# Patient Record
Sex: FEMALE | Race: OTHER | HISPANIC OR LATINO | Employment: UNEMPLOYED | URBAN - METROPOLITAN AREA
[De-identification: names, ages, dates, MRNs, and addresses within clinical notes are randomized per-mention and may not be internally consistent; named-entity substitution may affect disease eponyms.]

---

## 2017-01-10 ENCOUNTER — ALLSCRIPTS OFFICE VISIT (OUTPATIENT)
Dept: OTHER | Facility: OTHER | Age: 22
End: 2017-01-10

## 2017-05-01 ENCOUNTER — APPOINTMENT (OUTPATIENT)
Dept: LAB | Facility: CLINIC | Age: 22
End: 2017-05-01
Payer: COMMERCIAL

## 2017-05-01 ENCOUNTER — ALLSCRIPTS OFFICE VISIT (OUTPATIENT)
Dept: OTHER | Facility: OTHER | Age: 22
End: 2017-05-01

## 2017-05-01 DIAGNOSIS — Z11.1 ENCOUNTER FOR SCREENING FOR RESPIRATORY TUBERCULOSIS: ICD-10-CM

## 2017-05-01 DIAGNOSIS — Z00.00 ENCOUNTER FOR GENERAL ADULT MEDICAL EXAMINATION WITHOUT ABNORMAL FINDINGS: ICD-10-CM

## 2017-05-01 PROCEDURE — 86706 HEP B SURFACE ANTIBODY: CPT

## 2017-05-01 PROCEDURE — 36415 COLL VENOUS BLD VENIPUNCTURE: CPT

## 2017-05-01 PROCEDURE — 86480 TB TEST CELL IMMUN MEASURE: CPT

## 2017-05-01 PROCEDURE — 87340 HEPATITIS B SURFACE AG IA: CPT

## 2017-05-02 LAB
HBV SURFACE AB SER-ACNC: <3.1 MIU/ML
HBV SURFACE AG SER QL: NORMAL

## 2017-05-03 LAB
ANNOTATION COMMENT IMP: NORMAL
GAMMA INTERFERON BACKGROUND BLD IA-ACNC: 0.04 IU/ML
M TB IFN-G BLD-IMP: NEGATIVE
M TB IFN-G CD4+ BCKGRND COR BLD-ACNC: <0.01 IU/ML
M TB IFN-G CD4+ T-CELLS BLD-ACNC: 0.03 IU/ML
MITOGEN IGNF BLD-ACNC: 9.65 IU/ML
QUANTIFERON-TB GOLD IN TUBE: NORMAL
SERVICE CMNT-IMP: NORMAL

## 2017-05-04 ENCOUNTER — GENERIC CONVERSION - ENCOUNTER (OUTPATIENT)
Dept: OTHER | Facility: OTHER | Age: 22
End: 2017-05-04

## 2017-05-08 ENCOUNTER — ALLSCRIPTS OFFICE VISIT (OUTPATIENT)
Dept: OTHER | Facility: OTHER | Age: 22
End: 2017-05-08

## 2017-06-13 ENCOUNTER — APPOINTMENT (OUTPATIENT)
Dept: LAB | Facility: CLINIC | Age: 22
End: 2017-06-13
Payer: COMMERCIAL

## 2017-06-13 ENCOUNTER — GENERIC CONVERSION - ENCOUNTER (OUTPATIENT)
Dept: OTHER | Facility: OTHER | Age: 22
End: 2017-06-13

## 2017-06-13 ENCOUNTER — ALLSCRIPTS OFFICE VISIT (OUTPATIENT)
Dept: OTHER | Facility: OTHER | Age: 22
End: 2017-06-13

## 2017-06-13 DIAGNOSIS — Z00.00 ENCOUNTER FOR GENERAL ADULT MEDICAL EXAMINATION WITHOUT ABNORMAL FINDINGS: ICD-10-CM

## 2017-06-13 DIAGNOSIS — I95.1 ORTHOSTATIC HYPOTENSION: ICD-10-CM

## 2017-06-13 LAB
ALBUMIN SERPL BCP-MCNC: 3.4 G/DL (ref 3.5–5)
ALP SERPL-CCNC: 60 U/L (ref 46–116)
ALT SERPL W P-5'-P-CCNC: 22 U/L (ref 12–78)
ANION GAP SERPL CALCULATED.3IONS-SCNC: 7 MMOL/L (ref 4–13)
AST SERPL W P-5'-P-CCNC: 12 U/L (ref 5–45)
BASOPHILS # BLD AUTO: 0.05 THOUSANDS/ΜL (ref 0–0.1)
BASOPHILS NFR BLD AUTO: 1 % (ref 0–1)
BILIRUB SERPL-MCNC: 0.56 MG/DL (ref 0.2–1)
BUN SERPL-MCNC: 12 MG/DL (ref 5–25)
CALCIUM SERPL-MCNC: 8.8 MG/DL (ref 8.3–10.1)
CHLORIDE SERPL-SCNC: 103 MMOL/L (ref 100–108)
CO2 SERPL-SCNC: 30 MMOL/L (ref 21–32)
CREAT SERPL-MCNC: 0.48 MG/DL (ref 0.6–1.3)
EOSINOPHIL # BLD AUTO: 0.37 THOUSAND/ΜL (ref 0–0.61)
EOSINOPHIL NFR BLD AUTO: 4 % (ref 0–6)
ERYTHROCYTE [DISTWIDTH] IN BLOOD BY AUTOMATED COUNT: 12.3 % (ref 11.6–15.1)
GFR SERPL CREATININE-BSD FRML MDRD: >60 ML/MIN/1.73SQ M
GLUCOSE P FAST SERPL-MCNC: 84 MG/DL (ref 65–99)
HBV SURFACE AB SER-ACNC: 826.09 MIU/ML
HCT VFR BLD AUTO: 39.7 % (ref 34.8–46.1)
HGB BLD-MCNC: 13 G/DL (ref 11.5–15.4)
LYMPHOCYTES # BLD AUTO: 3 THOUSANDS/ΜL (ref 0.6–4.47)
LYMPHOCYTES NFR BLD AUTO: 35 % (ref 14–44)
MCH RBC QN AUTO: 29.7 PG (ref 26.8–34.3)
MCHC RBC AUTO-ENTMCNC: 32.7 G/DL (ref 31.4–37.4)
MCV RBC AUTO: 91 FL (ref 82–98)
MONOCYTES # BLD AUTO: 0.9 THOUSAND/ΜL (ref 0.17–1.22)
MONOCYTES NFR BLD AUTO: 11 % (ref 4–12)
NEUTROPHILS # BLD AUTO: 4.26 THOUSANDS/ΜL (ref 1.85–7.62)
NEUTS SEG NFR BLD AUTO: 49 % (ref 43–75)
NRBC BLD AUTO-RTO: 0 /100 WBCS
PLATELET # BLD AUTO: 309 THOUSANDS/UL (ref 149–390)
PMV BLD AUTO: 9.7 FL (ref 8.9–12.7)
POTASSIUM SERPL-SCNC: 3.7 MMOL/L (ref 3.5–5.3)
PROT SERPL-MCNC: 6.8 G/DL (ref 6.4–8.2)
RBC # BLD AUTO: 4.37 MILLION/UL (ref 3.81–5.12)
SODIUM SERPL-SCNC: 140 MMOL/L (ref 136–145)
TSH SERPL DL<=0.05 MIU/L-ACNC: 0.62 UIU/ML (ref 0.36–3.74)
WBC # BLD AUTO: 8.61 THOUSAND/UL (ref 4.31–10.16)

## 2017-06-13 PROCEDURE — 84443 ASSAY THYROID STIM HORMONE: CPT

## 2017-06-13 PROCEDURE — 85025 COMPLETE CBC W/AUTO DIFF WBC: CPT

## 2017-06-13 PROCEDURE — 36415 COLL VENOUS BLD VENIPUNCTURE: CPT

## 2017-06-13 PROCEDURE — 80053 COMPREHEN METABOLIC PANEL: CPT

## 2017-06-13 PROCEDURE — 86706 HEP B SURFACE ANTIBODY: CPT

## 2017-06-14 DIAGNOSIS — I95.1 ORTHOSTATIC HYPOTENSION: ICD-10-CM

## 2017-06-14 DIAGNOSIS — Z00.00 ENCOUNTER FOR GENERAL ADULT MEDICAL EXAMINATION WITHOUT ABNORMAL FINDINGS: ICD-10-CM

## 2017-07-17 ENCOUNTER — APPOINTMENT (OUTPATIENT)
Dept: LAB | Facility: CLINIC | Age: 22
End: 2017-07-17
Payer: COMMERCIAL

## 2017-07-17 DIAGNOSIS — Z00.00 ENCOUNTER FOR GENERAL ADULT MEDICAL EXAMINATION WITHOUT ABNORMAL FINDINGS: ICD-10-CM

## 2017-07-17 PROCEDURE — 36415 COLL VENOUS BLD VENIPUNCTURE: CPT

## 2017-07-17 PROCEDURE — 86480 TB TEST CELL IMMUN MEASURE: CPT

## 2017-07-20 ENCOUNTER — GENERIC CONVERSION - ENCOUNTER (OUTPATIENT)
Dept: OTHER | Facility: OTHER | Age: 22
End: 2017-07-20

## 2017-07-20 LAB
ANNOTATION COMMENT IMP: NORMAL
GAMMA INTERFERON BACKGROUND BLD IA-ACNC: 0.05 IU/ML
M TB IFN-G BLD-IMP: NEGATIVE
M TB IFN-G CD4+ BCKGRND COR BLD-ACNC: 0.02 IU/ML
M TB IFN-G CD4+ T-CELLS BLD-ACNC: 0.07 IU/ML
MITOGEN IGNF BLD-ACNC: 9.4 IU/ML
QUANTIFERON-TB GOLD IN TUBE: NORMAL
SERVICE CMNT-IMP: NORMAL

## 2017-10-02 ENCOUNTER — ALLSCRIPTS OFFICE VISIT (OUTPATIENT)
Dept: OTHER | Facility: OTHER | Age: 22
End: 2017-10-02

## 2017-10-02 DIAGNOSIS — E16.1 OTHER HYPOGLYCEMIA (CODE): ICD-10-CM

## 2017-10-02 DIAGNOSIS — K59.09 OTHER CONSTIPATION: ICD-10-CM

## 2017-10-02 DIAGNOSIS — R53.82 CHRONIC FATIGUE: ICD-10-CM

## 2017-10-02 DIAGNOSIS — R42 DIZZINESS AND GIDDINESS: ICD-10-CM

## 2017-10-02 DIAGNOSIS — R82.90 ABNORMAL FINDING IN URINE: ICD-10-CM

## 2017-10-03 NOTE — PROGRESS NOTES
Assessment  1  Dizziness (780 4) (R42)   2  Need for influenza vaccination (V04 81) (Z23)   3  Chronic fatigue (780 79) (R53 82)   4  Cold feeling (780 99) (R68 89)   5  Hair thinning (704 00) (L65 9)   6  Fasting hypoglycemia (251 1) (E16 1)   7  Chronic constipation (564 00) (K59 09)    Plan  Chronic constipation    · * US ABDOMEN COMPLETE; Status:Hold For - Scheduling; Requested for:02Oct2017;   Chronic fatigue    · (1) TSH WITH FT4 REFLEX; Status:Active; Requested for:02Oct2017;   Dizziness    · (1) C-PEPTIDE; Status:Active; Requested BNN:77ZHW0225;    · (1) INSULIN; Status:Active; Requested ARIELLA:63QZR1932;    · EKG/ECG- POC; Status:Active - Perform Order; Requested YDO:[id number];   Dizziness, Fasting hypoglycemia    · (1) HEMOGLOBIN A1C; Status:Active; Requested UBS:32QXH8378;   Fasting hypoglycemia    · (1) CBC/PLT/DIFF; Status:Active; Requested LEO:67CMA4790;    · (1) COMPREHENSIVE METABOLIC PANEL; Status:Active; Requested VDE:19ITP4374;    · (1) CORTISOL AM SPECIMEN; Status:Active; Requested UUN:59LXU6316;    · (1) OSMOLALITY, URINE; Status:Active; Requested CARL:[id number];   Need for influenza vaccination    · Fluzone Quadrivalent 0 5 ML Intramuscular Suspension Prefilled Syringe;  INJECT 0 5  ML Intramuscular; To Be Done: 77KHE2896    Discussion/Summary    Her symptoms are chronic fatigue chronic constipation episodes of dizzy and temporary loss of vision when she gets dizzy, I will order more labs EKGs done which is normal,order ultrasound of abdomen, to see if any mass in the adrenals and she has chronic constipation,vaccine is given and she will follow up 2-3 wks  The patient was counseled regarding instructions for management,-prognosis,-impressions,-risks and benefits of treatment options,-importance of compliance with treatment  Possible side effects of new medications were reviewed with the patient/guardian today  The treatment plan was reviewed with the patient/guardian   The patient/guardian understands and agrees with the treatment plan      Chief Complaint  Patient is here today for follow up of chronic conditions described in HPI  History of Present Illness  She says that few days ago on the weekend she was sitting in her bed and she started feeling something in her stomach feeling hungry, and then she started feeling dizzy , and she thought she could not see for few seconds and then she laid down and her heart was racing,did not lose any consciousness after a few minutes she went to the kitchen since then she has been feeling fine, she also says she is always cold, and her here has been losing for long time in last few years,has constipation most of the time for the last 5 year and history of more hair loss for 5 years  feels bloated all the time and she takes Metamucil which helpsfather is diabetic,says she also has lost some weight, but now she is gradually gaining her appetite is normal,denies any fever or chills,is on birth control pillsalso wants the flu vaccinehad eye exam which was normal      Review of Systems    Constitutional: No fever, no chills, feels well, no tiredness, no recent weight gain or weight loss  Eyes: No complaints of eye pain, no red eyes, no eyesight problems, no discharge, no dry eyes, no itching of eyes  ENT: no complaints of earache, no loss of hearing, no nose bleeds, no nasal discharge, no sore throat, no hoarseness  Cardiovascular: No complaints of slow heart rate, no fast heart rate, no chest pain, no palpitations, no leg claudication, no lower extremity edema  Respiratory: No complaints of shortness of breath, no wheezing, no cough, no SOB on exertion, no orthopnea, no PND  Gastrointestinal: No complaints of abdominal pain, no constipation, no nausea or vomiting, no diarrhea, no bloody stools  Genitourinary: No complaints of dysuria, no incontinence, no pelvic pain, no dysmenorrhea, no vaginal discharge or bleeding     Musculoskeletal: No complaints of arthralgias, no myalgias, no joint swelling or stiffness, no limb pain or swelling  Integumentary: No complaints of skin rash or lesions, no itching, no skin wounds, no breast pain or lump  Neurological: No complaints of headache, no confusion, no convulsions, no numbness, no dizziness or fainting, no tingling, no limb weakness, no difficulty walking  Psychiatric: Not suicidal, no sleep disturbance, no anxiety or depression, no change in personality, no emotional problems  Endocrine: No complaints of proptosis, no hot flashes, no muscle weakness, no deepening of the voice, no feelings of weakness  Hematologic/Lymphatic: No complaints of swollen glands, no swollen glands in the neck, does not bleed easily, does not bruise easily  ROS reviewed  Active Problems  1  Annual physical exam (V70 0) (Z00 00)   2  Back strain (847 9) (S39 012A)   3  Bacterial vaginosis (616 10,041 9) (N76 0,B96 89)   4  Birth control counseling (V25 09) (Z30 09)   5  Blurry vision (368 8) (H53 8)   6  Chlamydial infection of lower genitourinary tract (099 53) (A56 00)   7  Chronic fatigue (780 79) (R53 82)   8  Constipation (564 00) (K59 00)   9  Dysuria (788 1) (R30 0)   10  Encounter for routine gynecological examination (V72 31) (Z01 419)   11  Hair thinning (704 00) (L65 9)   12  Neck muscle spasm (728 85) (M62 838)   13  Need for hepatitis B vaccination (V05 3) (Z23)   14  Need for Tdap vaccination (V06 1) (Z23)   15  Postural hypotension (458 0) (I95 1)   16  Scoliosis (737 30) (M41 9)   17  Screening for STD (sexually transmitted disease) (V74 5) (Z11 3)   18  Tuberculosis screening (V74 1) (Z11 1)   19  Vaginal candidiasis (112 1) (B37 3)    Past Medical History    The active problems and past medical history were reviewed and updated today  Surgical History    The surgical history was reviewed and updated today  Family History  Father    1   Family history of diabetes mellitus (V18 0) (Z83 3)    The family history was reviewed and updated today  Social History   · Never a smoker   · No drug use  The social history was reviewed and updated today  Current Meds   1  Junel FE 1/20 1-20 MG-MCG Oral Tablet; TAKE 1 TABLET DAILY AS DIRECTED; Therapy: 40XZY8118 to (Evaluate:85Guc0527) Recorded    The medication list was reviewed and updated today  Allergies  1  No Known Drug Allergies    Vitals  Vital Signs    Recorded: 82BGF5825 02:39PM   Heart Rate 68   Respiration 16   Systolic 528   Diastolic 70   Height 5 ft 5 in   Weight 119 lb    BMI Calculated 19 8   BSA Calculated 1 59     Physical Exam    Constitutional   General appearance: No acute distress, well appearing and well nourished  Eyes   Conjunctiva and lids: No swelling, erythema or discharge  Pupils and irises: Equal, round and reactive to light  Ears, Nose, Mouth, and Throat   External inspection of ears and nose: Normal     Otoscopic examination: Tympanic membranes translucent with normal light reflex  Canals patent without erythema  Nasal mucosa, septum, and turbinates: Normal without edema or erythema  Oropharynx: Normal with no erythema, edema, exudate or lesions  Pulmonary   Respiratory effort: No increased work of breathing or signs of respiratory distress  Auscultation of lungs: Clear to auscultation  Cardiovascular   Palpation of heart: Normal PMI, no thrills  Auscultation of heart: Normal rate and rhythm, normal S1 and S2, without murmurs  Examination of extremities for edema and/or varicosities: Normal     Abdomen   Abdomen: Non-tender, no masses  Liver and spleen: No hepatomegaly or splenomegaly  Lymphatic   Palpation of lymph nodes in neck: No lymphadenopathy  Musculoskeletal   Gait and station: Normal     Inspection/palpation of joints, bones, and muscles: Normal     Skin   Skin and subcutaneous tissue: Abnormal  -few pimples on face and scalp     Neurologic   Cranial nerves: Cranial nerves 2-12 intact  Reflexes: 2+ and symmetric  Sensation: No sensory loss  Psychiatric   Orientation to person, place, and time: Normal     Mood and affect: Normal          Results/Data  PHQ-2 Adult Depression Screening 93LVC0769 02:43PM User, s     Test Name Result Flag Reference   PHQ-2 Adult Depression Score 0     Over the last two weeks, how often have you been bothered by any of the following problems? Little interest or pleasure in doing things: Not at all - 0  Feeling down, depressed, or hopeless: Not at all - 0   PHQ-2 Adult Depression Screening Negative       A 12 lead ECG was performed and was normal -No acute ischemia  Rhythm and rate: normal sinus rhythm        Signatures   Electronically signed by : Kallie Heimlich, M D ; Oct  2 2017  3:35PM EST                       (Author)

## 2017-10-04 ENCOUNTER — APPOINTMENT (OUTPATIENT)
Dept: LAB | Facility: CLINIC | Age: 22
End: 2017-10-04
Payer: COMMERCIAL

## 2017-10-04 DIAGNOSIS — E16.1 OTHER HYPOGLYCEMIA (CODE): ICD-10-CM

## 2017-10-04 DIAGNOSIS — R53.82 CHRONIC FATIGUE: ICD-10-CM

## 2017-10-04 DIAGNOSIS — R42 DIZZINESS AND GIDDINESS: ICD-10-CM

## 2017-10-04 LAB
ALBUMIN SERPL BCP-MCNC: 3.9 G/DL (ref 3.5–5)
ALP SERPL-CCNC: 75 U/L (ref 46–116)
ALT SERPL W P-5'-P-CCNC: 31 U/L (ref 12–78)
ANION GAP SERPL CALCULATED.3IONS-SCNC: 6 MMOL/L (ref 4–13)
AST SERPL W P-5'-P-CCNC: 22 U/L (ref 5–45)
BASOPHILS # BLD AUTO: 0.02 THOUSANDS/ΜL (ref 0–0.1)
BASOPHILS NFR BLD AUTO: 0 % (ref 0–1)
BILIRUB SERPL-MCNC: 1.19 MG/DL (ref 0.2–1)
BUN SERPL-MCNC: 10 MG/DL (ref 5–25)
CALCIUM SERPL-MCNC: 9.2 MG/DL (ref 8.3–10.1)
CHLORIDE SERPL-SCNC: 103 MMOL/L (ref 100–108)
CO2 SERPL-SCNC: 28 MMOL/L (ref 21–32)
CREAT SERPL-MCNC: 0.47 MG/DL (ref 0.6–1.3)
EOSINOPHIL # BLD AUTO: 0.15 THOUSAND/ΜL (ref 0–0.61)
EOSINOPHIL NFR BLD AUTO: 2 % (ref 0–6)
ERYTHROCYTE [DISTWIDTH] IN BLOOD BY AUTOMATED COUNT: 12.6 % (ref 11.6–15.1)
EST. AVERAGE GLUCOSE BLD GHB EST-MCNC: 100 MG/DL
GFR SERPL CREATININE-BSD FRML MDRD: 141 ML/MIN/1.73SQ M
GLUCOSE P FAST SERPL-MCNC: 78 MG/DL (ref 65–99)
HBA1C MFR BLD: 5.1 % (ref 4.2–6.3)
HCT VFR BLD AUTO: 41.5 % (ref 34.8–46.1)
HGB BLD-MCNC: 13.5 G/DL (ref 11.5–15.4)
INSULIN SERPL-ACNC: 3 MU/L (ref 3–25)
LYMPHOCYTES # BLD AUTO: 2.38 THOUSANDS/ΜL (ref 0.6–4.47)
LYMPHOCYTES NFR BLD AUTO: 29 % (ref 14–44)
MCH RBC QN AUTO: 29.7 PG (ref 26.8–34.3)
MCHC RBC AUTO-ENTMCNC: 32.5 G/DL (ref 31.4–37.4)
MCV RBC AUTO: 91 FL (ref 82–98)
MONOCYTES # BLD AUTO: 0.78 THOUSAND/ΜL (ref 0.17–1.22)
MONOCYTES NFR BLD AUTO: 10 % (ref 4–12)
NEUTROPHILS # BLD AUTO: 4.87 THOUSANDS/ΜL (ref 1.85–7.62)
NEUTS SEG NFR BLD AUTO: 59 % (ref 43–75)
NRBC BLD AUTO-RTO: 0 /100 WBCS
PLATELET # BLD AUTO: 300 THOUSANDS/UL (ref 149–390)
PMV BLD AUTO: 9.7 FL (ref 8.9–12.7)
POTASSIUM SERPL-SCNC: 3.7 MMOL/L (ref 3.5–5.3)
PROT SERPL-MCNC: 7.8 G/DL (ref 6.4–8.2)
RBC # BLD AUTO: 4.55 MILLION/UL (ref 3.81–5.12)
SODIUM SERPL-SCNC: 137 MMOL/L (ref 136–145)
TSH SERPL DL<=0.05 MIU/L-ACNC: 0.54 UIU/ML (ref 0.36–3.74)
WBC # BLD AUTO: 8.22 THOUSAND/UL (ref 4.31–10.16)

## 2017-10-04 PROCEDURE — 85025 COMPLETE CBC W/AUTO DIFF WBC: CPT

## 2017-10-04 PROCEDURE — 83036 HEMOGLOBIN GLYCOSYLATED A1C: CPT

## 2017-10-04 PROCEDURE — 83525 ASSAY OF INSULIN: CPT

## 2017-10-04 PROCEDURE — 80053 COMPREHEN METABOLIC PANEL: CPT

## 2017-10-04 PROCEDURE — 84681 ASSAY OF C-PEPTIDE: CPT

## 2017-10-04 PROCEDURE — 84443 ASSAY THYROID STIM HORMONE: CPT

## 2017-10-05 ENCOUNTER — TRANSCRIBE ORDERS (OUTPATIENT)
Dept: LAB | Facility: CLINIC | Age: 22
End: 2017-10-05

## 2017-10-05 ENCOUNTER — APPOINTMENT (OUTPATIENT)
Dept: LAB | Facility: CLINIC | Age: 22
End: 2017-10-05
Payer: COMMERCIAL

## 2017-10-05 DIAGNOSIS — E16.1 IATROGENIC HYPERINSULINISM: Primary | ICD-10-CM

## 2017-10-05 DIAGNOSIS — E16.1 OTHER HYPOGLYCEMIA (CODE): ICD-10-CM

## 2017-10-05 LAB
C PEPTIDE SERPL-MCNC: 1.3 NG/ML (ref 1.1–4.4)
CORTIS AM PEAK SERPL-MCNC: 11.1 UG/DL (ref 4.2–22.4)
OSMOLALITY UR: 1029 MMOL/KG

## 2017-10-05 PROCEDURE — 82533 TOTAL CORTISOL: CPT

## 2017-10-05 PROCEDURE — 83935 ASSAY OF URINE OSMOLALITY: CPT

## 2017-10-06 ENCOUNTER — GENERIC CONVERSION - ENCOUNTER (OUTPATIENT)
Dept: OTHER | Facility: OTHER | Age: 22
End: 2017-10-06

## 2017-10-06 ENCOUNTER — HOSPITAL ENCOUNTER (OUTPATIENT)
Dept: ULTRASOUND IMAGING | Facility: HOSPITAL | Age: 22
Discharge: HOME | End: 2017-10-06
Attending: FAMILY MEDICINE
Payer: COMMERCIAL

## 2017-10-06 DIAGNOSIS — K59.09 OTHER CONSTIPATION: ICD-10-CM

## 2017-10-06 PROCEDURE — 76700 US EXAM ABDOM COMPLETE: CPT

## 2017-10-16 ENCOUNTER — GENERIC CONVERSION - ENCOUNTER (OUTPATIENT)
Dept: OTHER | Facility: OTHER | Age: 22
End: 2017-10-16

## 2017-10-18 ENCOUNTER — APPOINTMENT (OUTPATIENT)
Dept: LAB | Facility: CLINIC | Age: 22
End: 2017-10-18
Payer: COMMERCIAL

## 2017-10-18 DIAGNOSIS — R82.90 ABNORMAL FINDING IN URINE: ICD-10-CM

## 2017-10-18 LAB
OSMOLALITY UR/SERPL-RTO: 283 MMOL/KG (ref 282–298)
OSMOLALITY UR: 218 MMOL/KG

## 2017-10-18 PROCEDURE — 83935 ASSAY OF URINE OSMOLALITY: CPT

## 2017-10-18 PROCEDURE — 83930 ASSAY OF BLOOD OSMOLALITY: CPT

## 2017-10-19 ENCOUNTER — GENERIC CONVERSION - ENCOUNTER (OUTPATIENT)
Dept: OTHER | Facility: OTHER | Age: 22
End: 2017-10-19

## 2017-12-07 ENCOUNTER — TRANSCRIBE ORDERS (OUTPATIENT)
Dept: ADMINISTRATIVE | Facility: HOSPITAL | Age: 22
End: 2017-12-07

## 2017-12-07 ENCOUNTER — ALLSCRIPTS OFFICE VISIT (OUTPATIENT)
Dept: OTHER | Facility: OTHER | Age: 22
End: 2017-12-07

## 2017-12-07 DIAGNOSIS — H54.7 UNSPECIFIED VISUAL LOSS: ICD-10-CM

## 2017-12-07 DIAGNOSIS — R25.1 DYSPHONIA OF ORGANIC TREMOR: ICD-10-CM

## 2017-12-07 DIAGNOSIS — R55 SYNCOPE AND COLLAPSE: Primary | ICD-10-CM

## 2017-12-07 DIAGNOSIS — R49.0 DYSPHONIA OF ORGANIC TREMOR: ICD-10-CM

## 2017-12-08 NOTE — CONSULTS
Assessment    1  Blurry vision (368 8) (H53 8)   2  Vision loss (369 9) (H54 7)   3  Tremor of unknown origin (781 0) (R25 1)   4  Syncope and collapse (780 2) (R55)   5  Constipation (564 00) (K59 00)    Plan  Constipation    · *1 -  [facility] Co-[facility]  States can beconstipated for daysRecent bilirubin mildly elevated  Status: Active  Requested for: 35JYR5863   Ordered; Constipation; Ordered By: Brenda Beard Performed:  Due: 14OMI4609  Care Summary provided  : Yes  Syncope and collapse    · *1 - [facility] (CARDIOLOGY ) Co-Management  reports heartpalpitaitons with syncope  Status: Active  Requested for: 07OKD2285   Ordered;Syncope and collapse; Ordered By: Brenda Beard Performed:  Due: 41CLH0823  Care Summary provided  : Yes   · * MRI BRAIN SEIZURE WO AND W CONTRAST; Status:Need Information - FinancialAuthorization; Requested for:67Bdr7644 02:00PM;    Perform:[facility]; Due:18Kvh3373; Last Updated By:Colleen Sánchez; 12/7/2017 4:10:38 PM;Ordered;and collapse; Ordered By:Katia Benoit;  Syncope and collapse, Tremor of unknown origin, Vision loss    · Follow-up visit in 4 Months Evaluation and Treatment  Follow-up  Status: Hold For -Scheduling  Requested for: 90MRX5243   Ordered; For: Syncope and collapse, Tremor of unknown origin, Vision loss; Ordered By: Brenda Beard Performed:  Due: 07NZA6149   · EEG AWAKE (ROUTINE); Status:Active; Requested for:66Wxu4599 10:30AM;    Perform:[facility]; Due:58Ehq6173; Last Updated By:Colleen Sánchez; 12/7/2017 4:07:29 PM;Ordered;and collapse, Tremor of unknown origin, Vision loss; Ordered By:Katia Benoit;    Discussion/Summary  Discussion Summary:   Ms Donita Vu is a pleasant 24 yo female seen in consultation for one episode of syncope early June, and three episodes of vision loss   Per description- this appears to be orthostatic and/or due to diffuse cerebral hypoperfusion secondary to an arrhythmia or other cardiac etiology or potential hypoglycemia  Does not sound like a typical seizure  describes whole body weakness with her vision loss improved with eating  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), discussed family members or patient taking her BP during or immediately after one of these events if able  Will obtain EEG to rule out seizureEKG per PCP normal per patient and momWill refer to cardiology for Holter Monitor/ ECHO as warrantedCMP with increased bilirubin otherwise normal- will refer to GI per patient and mom's preference- as she has been suffering from constipation for 7 years and has had recent 12 lb weight loss in a monthWill obtain MRI Brain w/w/o contrast for further structural evaluation, thin cuts through the [place]  Discussed eating small frequent meals and staying hydrated every 4 to 6 hours  Her last syncopal event was June  If she has another syncopal event, discussed no driving for six months afterward  In regards to her vision loss, she has not had this while driving, and appears to occur after a prolonged period of no meals, and/ or not drinking fluids  Discussed the importance of doing so   is in agreement with the above treatment plan  up in four months time  Counseling Documentation With Imm: The patient, patient's family was counseled regarding  Medication SE Review and Pt Understands Tx: The treatment plan was reviewed with the patient/guardian  The patient/guardian understands and agrees with the treatment plan      Chief Complaint  Chief Complaint Free Text Note Form: Patient present for neurology consultation regarding blurry vision  History of Present Illness  HPI: Ms Delgadillo 34 is a pleasant 26 yo female seen in consultation for vision loss- total of 3 times since June  first episode, she tells me was around 1 in the afternoon, got out of the car and states saw bright lights- then complete loss of vision (black) for 3 minutes and then stomach pain and weakness of her entire body- states nausea and emesis, and then LOC- states for a few seconds- states did not have significant confusion or tongue biting or bowel or bladder control loss  States no convulsions  States does have rapid heart rate during and after the event  second event occurred at school October, when she was sitting down and same event as above, however did not have LOC or emesis with this one  States if she goes and gets food it helps  last event this past Tuesday, states had similar episode, however did not have LOC  since this event started happening, she has started to eat more regular meals  Mom present with her tells me though she can still go 8-10 hours without any food or water, due to her working and going to school  does go to school and work and sleep deprived  Denies being particularly sleep deprived the days these events occurred  headaches  denies anxiety or significantly increased stressany other health history  history concussions  also describes occasional recent right leg tremor which lasts for a few minutes, which resolves after eating  She states she cannot stop it if she focuses on it  report 12 lb unexpected weight loss in one month (states she is slowly regaining her weight back)  Has had constipation issues for seven years  Denies fevers chills night sweats  birth- born at 7 months per mom  States no developmental issues per  significant family history per patient or mom  has not occurred during driving, states with frequent eating this does not happen, we discussed the importance of having something to eat every few hours  Discussed if another syncopal event, no driving for six month afterward  Review of Systems  Neurological ROS:  Constitutional: recent weight loss,-- fatigue-- and-- appetite changes  HEENT: blurred vision    Cardiovascular:  no chest pain or pressure, no palpitations present, the heart rate was not rapid or irregular, no swelling in the arms or legs, no poor circulation  Respiratory: shortness of breath with or without exertion  Gastrointestinal: loss of bowel control--   no nausea, no vomiting, no diarrhea, no abdominal pain, no changes in bowel habits, no melena, no loss of bowel control  Genitourinary:  no incontinence, no feelings of urinary urgency, no increase in frequency, no urinary hesitancy, no dysuria, no hematuria  Musculoskeletal: myalgias-- and-- head/neck/back pain  Integumentary  no masses, no rash, no skin lesions, no livedo reticularis  Psychiatric:  no anxiety, no depression, no mood swings, no psychiatric hospitalizations, no sleep problems  Endocrine hair loss or gain,-- menstrual period change or irregularity-- and-- loss of sexual ability or drive   Hematologic/Lymphatic:  no unusual bleeding, no tendency for easy bruising, no clotting skin or lumps  Neurological General: increased sleepiness-- and-- trouble falling asleep  Neurological Mental Status:  no confusion, no mood swings, no alteration or loss of consciousness, no difficulty expressing/understanding speech, no memory problems  Neurological Cranial Nerves: loss of vision  Neurological Motor findings include: twitching  Neurological Coordination:  no unsteadiness, no vertigo or dizziness, no clumsiness, no problems reaching for objects  Neurological Sensory:  no numbness, no pain, no tingling, does not fall when eyes closed or taking a shower  Neurological Gait:  no difficulty walking, not falling to one side, no sensation of being pushed, has not had falls  ROS Reviewed:   ROS reviewed  Active Problems  1  Abnormal urine finding (791 9) (R82 90)   2  Annual physical exam (V70 0) (Z00 00)   3  Back strain (847 9) (S39 012A)   4  Bacterial vaginosis (616 10,041 9) (N76 0,B96 89)   5  Birth control counseling (V25 09) (Z30 09)   6  Blurry vision (368 8) (H53 8)   7  Chlamydial infection of lower genitourinary tract (099 53) (A56 00)   8   Chronic constipation (564 00) (K59 09)   9  Chronic fatigue (780 79) (R53 82)   10  Cold feeling (780 99) (R68 89)   11  Constipation (564 00) (K59 00)   12  Dizziness (780 4) (R42)   13  Dysuria (788 1) (R30 0)   14  Encounter for routine gynecological examination (V72 31) (Z01 419)   15  Fasting hypoglycemia (251 1) (E16 1)   16  Hair thinning (704 00) (L65 9)   17  Neck muscle spasm (728 85) (M62 838)   18  Need for hepatitis B vaccination (V05 3) (Z23)   19  Need for influenza vaccination (V04 81) (Z23)   20  Need for Tdap vaccination (V06 1) (Z23)   21  Postural hypotension (458 0) (I95 1)   22  Scoliosis (737 30) (M41 9)   23  Screening for STD (sexually transmitted disease) (V74 5) (Z11 3)   24  Skin lesion of scalp (709 9) (L98 9)   25  Tuberculosis screening (V74 1) (Z11 1)   26  Vaginal candidiasis (112 1) (B37 3)    Past Medical History  Active Problems And Past Medical History Reviewed: The active problems and past medical history were reviewed and updated today  Family History  Father    1  Family history of diabetes mellitus (V18 0) (Z83 3)  Family History Reviewed: The family history was reviewed and updated today  Social History     · Never a smoker   · No drug use  Social History Reviewed: The social history was reviewed and updated today  Current Meds   1  Junel FE 1/20 1-20 MG-MCG Oral Tablet; TAKE 1 TABLET DAILY AS DIRECTED; Therapy: 67CVG3052 to (Evaluate:74Eft1052) Recorded    Allergies    1  No Known Drug Allergies    Vitals  Signs   Recorded: 62WXC0341 03:29PM   Respiration: 16  Systolic: 779  Diastolic: 62  Height: 5 ft 0 5 in  Weight: 124 lb   BMI Calculated: 23 82  BSA Calculated: 1 53    Physical Exam   Constitutional  General appearance: Abnormal   underweight  Eyes  Ophthalmoscopic examination: Vision is grossly normal  Gross visual field testing by confrontation shows no abnormalities  EOMI in both eyes  Conjunctivae clear   Eyelids normal palpebral fissures equal  Orbits exhibit normal position  No discharge from the eyes  PERRL  External inspection of ears and nose: Normal      Neck  Neck and thyroid: Normal to inspection and palpation  Pulmonary  Respiratory effort: Lungs are clear bilaterally  Cardiovascular  Auscultation of heart: Rate is regular  Rhythm is regular  Peripheral vascular exam: Normal pulses throughout, no tenderness, erythema or swelling  Musculoskeletal  Gait and Station: Walks with normal gait  Tandem walk test is normal  Romberg's test is negative  Muscle strength: Normal strength throughout  Muscle tone: No atrophy, abnormal movements, flaccidity, cogwheeling or spasticity  Range of motion: Normal     Stability: Normal     Inspection of temporomandibular joint appears normal    Neurologic  Orientation to person, place, and time: Normal    Attention span and concentration: Normal thought process and attention span  Language: Names objects, able to repeat phrases and speaks spontaneously  Fund of knowledge: Normal vocabulary with appropriate knowledge of current events and past history  Sensation: Intact sensation to pinprick, temperature, vibration, and proprioception in all four extremities  Reflexes: DTR's are normal and symmetric bilaterally  Babinski's reflex is negative bilaterally  No pathologic ankle clonus  Coordination: Cerebellum function intact  No involuntary movement or psychomotor activity  Motor System: No pronator drift  Upper Extremities: Normal to inspection  No tenderness over the upper extremities bilaterally  No instability bilaterally  Strength: Motor strength is 5/5 bilaterally  Normal muscle tone bilaterally  Muscle bulk: Muscle bulk is normal bilaterally  Full ROM bilaterally  Lower Extremities: Normal to inspection and palpation  No tenderness of the lower extremities bilaterally  Exhibits no instability bilaterally  Strength: Motor strength is 5/5 bilaterally  Normal muscle tone bilaterally   Muscle Bulk: Muscle bulk is normal bilaterally  Full ROM bilaterally  Cranial Nerve Exam  II: Normal with no deficit  III,IV, VI: Normal with no deficit  V: Normal with no deficit  VII: Normal with no deficit  VIII: Normal with no deficit  IX: Normal with no deficit  X: Normal with no deficit  XI: Normal with no deficit  XII: Normal with no deficit  Recent and remote memory: Intact      Mood and affect: Normal        Signatures   Electronically signed by : Naty Carreon MD; Dec  7 2017  4:23PM EST                       (Author)

## 2017-12-26 ENCOUNTER — GENERIC CONVERSION - ENCOUNTER (OUTPATIENT)
Dept: OTHER | Facility: OTHER | Age: 22
End: 2017-12-26

## 2017-12-26 ENCOUNTER — HOSPITAL ENCOUNTER (OUTPATIENT)
Dept: NEUROLOGY | Facility: AMBULATORY SURGERY CENTER | Age: 22
Discharge: HOME | End: 2017-12-29
Payer: COMMERCIAL

## 2017-12-26 ENCOUNTER — HOSPITAL ENCOUNTER (OUTPATIENT)
Dept: MRI IMAGING | Facility: HOSPITAL | Age: 22
Discharge: HOME | End: 2017-12-26
Attending: PSYCHIATRY & NEUROLOGY
Payer: COMMERCIAL

## 2017-12-26 DIAGNOSIS — H54.7 UNSPECIFIED VISUAL LOSS: ICD-10-CM

## 2017-12-26 DIAGNOSIS — R55 SYNCOPE AND COLLAPSE: ICD-10-CM

## 2017-12-26 DIAGNOSIS — R49.0 DYSPHONIA OF ORGANIC TREMOR: ICD-10-CM

## 2017-12-26 DIAGNOSIS — R25.1 DYSPHONIA OF ORGANIC TREMOR: ICD-10-CM

## 2017-12-26 PROCEDURE — 70553 MRI BRAIN STEM W/O & W/DYE: CPT

## 2017-12-26 PROCEDURE — 95816 EEG AWAKE AND DROWSY: CPT

## 2017-12-26 PROCEDURE — A9585 GADOBUTROL INJECTION: HCPCS | Performed by: PSYCHIATRY & NEUROLOGY

## 2017-12-26 RX ADMIN — GADOBUTROL 5 ML: 604.72 INJECTION INTRAVENOUS at 16:01

## 2017-12-26 NOTE — PROCEDURES
ELECTROENCEPHALOGRAM    Patient Name:  April De Los Santos  MRN: [id number]   :  1995 File #: [facility]    Age: 25 y o  Encounter #: [id number]   Date performed: 2017 Referring Provider: Yogesh Calhoun MD          Report date: 2017          Study type: awake and drowsy EEG    ICD 10 diagnosis: Transient alteration of awareness R40 4 and Episode of transient neurological symptoms R29 90    Patient History:  EEG is requested to assess for seizures and/or classification of epilepsy  Patient is 25 y o  female who had an episode of loss of vision, weakness, and loss of consciousness  She had a similar episode two weeks prior to this visit without loss of consciousness  Current AEDs: None  Medications include:     Description of Procedure: The EEG was performed with electrodes applied using the International 10-20 System  Additional electrodes used included EOG, ECG and T1/T2 electrodes  A single lead ECG channel is also present  At least 16 channels are reviewed at a time and formatted into longitudinal bipolar, transverse bipolar, and referential (to common reference or calculated common reference) montages  The EEG was recorded with the patient awake and drowsy  The recording was technically satisfactory  EEG was recorded from 11:10 to 11:41  Findings:   Background Activity: The background is grossly symmetric with respect to voltages and activities  During wakefulness, the background is well-organized with anterior very low amplitude beta activity and very low amplitude posterior alpha activity  There is a symmetric 10-10 5 Hz posterior dominant rhythm that attenuates with eye opening  Drowsiness is characterized by attentuation of the alpha rhythm and prominent anterior beta activity  Activation Procedures:   Hyperventilation was performed with good effort up to 4 minutes and did not produce any abnormalities    Stepped photic stimulation from 1 to 30 fps was performed and produced no abnormality  Other findings: The single lead ECG shows a regular and sinus rhythm  Interpretation: This is a normal 31 minutes awake and drowsy EEG          Marian Speaker, MD [facility]  [address] [place] [address]

## 2018-01-12 VITALS
BODY MASS INDEX: 19.83 KG/M2 | RESPIRATION RATE: 16 BRPM | DIASTOLIC BLOOD PRESSURE: 70 MMHG | WEIGHT: 119 LBS | SYSTOLIC BLOOD PRESSURE: 108 MMHG | HEIGHT: 65 IN | HEART RATE: 68 BPM

## 2018-01-12 NOTE — RESULT NOTES
Verified Results  *(Q) SURESWAB(R), VAGINOSIS/VAGINITIS PLUS 60Mig6833 11:33AM Aaliyah Lottmichael     Test Name Result Flag Reference   CHLAMYDIA TRACHOMATIS$RNA, TMA Not Detected  Not Detected   NEISSERIA [address], TMA Not Detected  Not Detected   This test was performed using the APTIMA COMBO2(R)  Assay (GEN-PROBE(R))  The analytical performance characteristics of this  assay, when used to test SurePath(R) specimens have  been determined by [employer]  BV CATEGORY: EQUIVOCAL A NOT SUPPORTIV   LACTOBACILLUS SPECIES 6 2 Log (cells/mL)     ATOPOBIUM VAGINAE      Not Detected Log (cells/mL)   MEGASPHAERA SPECIES 6 4 Log (cells/mL)     GARDNERELLA VAGINALIS <4 7 Log (cells/mL)     NOT SUPPORTIVE OF BV: The pattern of results is not  supportive of a diagnosis of BV: 1)Presence of  Lactobacillus spp , G  vaginalis levels less than 6 0  log cells/mL, and absence of A  vaginae and Megasphaera  spp; or 2)Absence of all targeted organisms; or  3)Absence of Lactobacillus spp   plus G  vaginalis  detected at levels less than 6 0 log cells/mL and  absence of A  vaginae and Megasphaera spp  EQUIVOCAL FOR BV: The pattern of results is neither  supportive nor not supportive of a diagnosis of BV  The patient may be in transition into or out of BV:  Presence of Lactobacillus spp  plus G  vaginalis  (greater or equal to 6 0 log cells/mL) and/or one of  the other BV-associated pathogens  SUPPORTIVE OF BV: The pattern of results is supportive  of a diagnosis of BV: Absence of Lactobacillus spp  and  presence of G  vaginalis greater than or equal to 6 0  log cells/mL and/or one or both of the other  BV-associated pathogens  Concentration for Lactobacilli (L  acidophilus/  crispatus, L  jensenii) are collectively reported under  the term "Lactobacillus spp ", as these species are  among the peroxide producing Lactobacilli thought to  be protective against bacterial vaginosis   Atopobium  vaginae, Megasphaera spp , and Gardnerella (greater  than 6 0 log cells/mL) have been associated with  vaginosis when present in the absence of peroxide  producing Lactobacilli  This test was developed and its analytical  performance characteristics have been determined  by [facility] [place], [place]  It has not been cleared or approved by the FDA  This  assay has been validated pursuant to the CLIA  regulations and is used for clinical purposes  TRICHOMONAS VAGINALIS RNA$QUALITATIVE TMA Not Detected  Not Detected   This test was performed using the [employer] Trichomonas  vaginalis Assay ([employer] [place])  For more information on this test, go to  http://[facility]/faq/  Trichomonastma   C  ALBICANS, DNA Not Detected  Not Detected   C  GLABRATA, DNA Not Detected  Not Detected   C  TROPICALIS, DNA Not Detected  Not Detected   C  PARAPSILOSIS, DNA Not Detected  Not Detected   This test was developed and its analytical  performance characteristics have been determined  by [facility] [place], [place]  It has not been cleared or approved by the FDA  This  assay has been validated pursuant to the CLIA  regulations and is used for clinical purposes

## 2018-01-12 NOTE — RESULT NOTES
Verified Results  (1) OSMOLALITY, URINE 92Tgx1942 07:05AM Shai Hardwick     Test Name Result Flag Reference   OSMOL, URINE 1029 mmol/KG H 250-900     (1) C-PEPTIDE [id number] 01:06PM Marlan July Order Number: [id number]     Test Name Result Flag Reference   C PEPTIDE 1 3 ng/mL  1 1 - 4 4   C-Peptide reference interval is for fasting patients      Performed at:  [address] [place] [address]  [id number]  : Cherri De MD, Phone:  [phone]

## 2018-01-13 VITALS
RESPIRATION RATE: 16 BRPM | DIASTOLIC BLOOD PRESSURE: 68 MMHG | WEIGHT: 121 LBS | HEIGHT: 65 IN | SYSTOLIC BLOOD PRESSURE: 100 MMHG | BODY MASS INDEX: 20.16 KG/M2 | HEART RATE: 73 BPM

## 2018-01-13 NOTE — RESULT NOTES
Verified Results  (Q) THIN PREP TIS PAP RFX HPV 27Oct2016 11:33AM [facility]     Test Name Result Flag Reference   CLINICAL INFORMATION:      NORMAL EXAM   LMP:      2 MONTHS AGO   PREV  PAP:      NEVER   PREV  BX:      NONE GIVEN   SOURCE:      CERVICAL   STATEMENT OF ADEQUACY:      Satisfactory for evaluation  Endocervical/transformation zone component  present  INTERPRETATION/RESULT:      Negative for intraepithelial lesion or malignancy  COMMENT:      This Pap test has been evaluated with computer  assisted technology     CYTOTECHNOLOGIST:      NINO ORELLANA(ASCP)  CT screening location: [address] [place] [address]       Discussion/Summary   normal PAP

## 2018-01-13 NOTE — RESULT NOTES
Verified Results  (1) HEP B SURFACE ANTIGEN [id number] 02:35PM Michelle Li Order Number: [id number]     Test Name Result Flag Reference   HEPATITIS B SURFACE ANTIGEN Non-reactive  Non-reactive, NonReactive - Confirmed     (1) HEP B SURFACE ANTIBODY [id number] 02:35PM Michelle Li Order Number: [id number]     Test Name Result Flag Reference   HEPATITIS B SURFACE ANTIBODY <3 10 mIU/mL     Protective Immunity: Hep B Surface Antibody >= 10 mIu/ml (Traceable to [facility] International Reference Preparation)     (1) QUANTIFERON - TB GOLD [id number] 02:35PM Michelle Li Order Number: [id number]     Test Name Result Flag Reference   QUANTIFERON TB GOLD      Specimen incubated at [place], [place]    Performed at:  [address] [place] [address]  [id number]  : Steve Pennington MD, Phone:  [phone]   QUANTIFERON TB GOLD Negative  Negative   QUANTIFERON CRITERIA Comment     To be considered positive a specimen should have a TB Ag minus Nil  value greater than or equal to 0 35 IU/mL and in addition the TB Ag  minus Nil value must be greater than or equal to 25% of the Nil  value  There may be insufficient information in these values to  differentiate between some negative and some indeterminate test  values  QUANTIFERON TB AG VALUE 0 03 IU/mL     QUANTIFERON NIL VALUE 0 04 IU/mL     QUANTIFERON MITOGEN VALUE 9 65 IU/mL     QFT TB AG MINUS NIL VALUE <0 01 IU/mL     QFT INTERPRETATION Comment     The QuantiFERON TB Gold (in Tube) assay is intended for use as an aid  in the diagnosis of TB infection  Negative results suggest that there  is no TB infection  In patients with high suspicion of exposure, a  negative test should be repeated  A positive test indicates infection  with Mycobacterium tuberculosis  Among individuals without  tuberculosis infection, a positive test may be due to exposure to  New [place]la, M  bob or M  marinum   On the Internet, go to  cdc gov/tb for further details      Performed at:  [address]  [id number]  : Feliz Smith MD, Phone:  [phone]

## 2018-01-14 VITALS
HEART RATE: 74 BPM | RESPIRATION RATE: 16 BRPM | SYSTOLIC BLOOD PRESSURE: 110 MMHG | HEIGHT: 65 IN | DIASTOLIC BLOOD PRESSURE: 70 MMHG | BODY MASS INDEX: 20.99 KG/M2 | WEIGHT: 126 LBS

## 2018-01-14 VITALS
DIASTOLIC BLOOD PRESSURE: 50 MMHG | SYSTOLIC BLOOD PRESSURE: 92 MMHG | WEIGHT: 119 LBS | RESPIRATION RATE: 16 BRPM | BODY MASS INDEX: 19.83 KG/M2 | HEIGHT: 65 IN | HEART RATE: 84 BPM

## 2018-01-16 NOTE — MISCELLANEOUS
Message  Return to work or school:      She is able to work with limitations (no driving of any kind)  Until evaluated at follow up visit 10/16/2017  Brunilda Schmitz MD / YG/MA        Signatures   Electronically signed by : Brunilda Schmitz MD; Oct  9 2017  3:22PM EST                       (Author)

## 2018-01-16 NOTE — PROGRESS NOTES
Assessment    1  Annual physical exam (V70 0) (Z00 00)   2  Need for Tdap vaccination (V06 1) (Z23)   3  Tuberculosis screening (V74 1) (Z11 1)    Plan  Annual physical exam    · Begin a limited exercise program ; Status:Complete;   Done: 02JDZ9697   · Drink plenty of fluids ; Status:Complete;   Done: 11XVL3887   · Stretch and warm up your muscles during the first 10 minutes , then cool down your  muscles for the last 10 minutes of exercise ; Status:Complete;   Done: 42NZB9036   · Use a sun block product with an SPF of 15 or more ; Status:Complete;   Done:  93SJK4712   · We recommend that you change your eating habits slowly ; Status:Complete;   Done:  00BET2608   · (1) HEP B SURFACE ANTIBODY; Status:Active; Requested for:01May2017;    · (1) HEP B SURFACE ANTIGEN; Status:Active; Requested for:01May2017;   Neck muscle spasm    · Cyclobenzaprine HCl - 5 MG Oral Tablet  Need for Tdap vaccination    · Adacel 5-2-15 5 LF-MCG/0 5 Intramuscular Suspension; INJECT 0 5  ML  Intramuscular; To Be Done: 68TKQ0483  Tuberculosis screening    · (1) QUANTIFERON - TB GOLD; Status:Active; Requested for:01May2017;     Discussion/Summary  health maintenance visit Currently, she eats a healthy diet and has an adequate exercise regimen  cervical cancer screening is current The immunizations are needed, immunizations will be given as outlined in the orders and adacel  Advice and education were given regarding nutrition, aerobic exercise and contraception  Patient discussion: discussed with the patient  Normal physical,  adacel given , labs ordered, and form will be filled after results  The patient was counseled regarding instructions for management, risk factor reductions, impressions, importance of compliance with treatment  Chief Complaint  PREVENTATIVE      History of Present Illness  HM, Adult Female: The patient is being seen for a health maintenance evaluation  General Health:  The patient's health since the last visit is described as good  She complains of vision problems  Vision care includes wearing glasses  She denies hearing loss  Immunizations status: up to date  Lifestyle:  She consumes a diverse and healthy diet  She does not exercise regularly  She does not use tobacco  She denies alcohol use  She denies drug use  Reproductive health:  she reports normal menses  Screening:   HPI: Physical, she needs a form to be filled for her college requirement, she is going as a dental hygienist      Review of Systems    Constitutional: No fever, no chills, feels well, no tiredness, no recent weight gain or weight loss  Eyes: No complaints of eye pain, no red eyes, no eyesight problems, no discharge, no dry eyes, no itching of eyes  ENT: no complaints of earache, no loss of hearing, no nose bleeds, no nasal discharge, no sore throat, no hoarseness  Cardiovascular: No complaints of slow heart rate, no fast heart rate, no chest pain, no palpitations, no leg claudication, no lower extremity edema  Respiratory: No complaints of shortness of breath, no wheezing, no cough, no SOB on exertion, no orthopnea, no PND  Gastrointestinal: No complaints of abdominal pain, no constipation, no nausea or vomiting, no diarrhea, no bloody stools  Genitourinary: No complaints of dysuria, no incontinence, no pelvic pain, no dysmenorrhea, no vaginal discharge or bleeding  Musculoskeletal: No complaints of arthralgias, no myalgias, no joint swelling or stiffness, no limb pain or swelling  Integumentary: No complaints of skin rash or lesions, no itching, no skin wounds, no breast pain or lump  Neurological: No complaints of headache, no confusion, no convulsions, no numbness, no dizziness or fainting, no tingling, no limb weakness, no difficulty walking  Psychiatric: Not suicidal, no sleep disturbance, no anxiety or depression, no change in personality, no emotional problems     Endocrine: No complaints of proptosis, no hot flashes, no muscle weakness, no deepening of the voice, no feelings of weakness  Hematologic/Lymphatic: No complaints of swollen glands, no swollen glands in the neck, does not bleed easily, does not bruise easily  ROS reviewed  Active Problems    1  Back strain (847 9) (S39 012A)   2  Bacterial vaginosis (616 10,041 9) (N76 0,B96 89)   3  Birth control counseling (V25 09) (Z30 09)   4  Chlamydial infection of lower genitourinary tract (099 53) (A56 00)   5  Chronic fatigue (780 79) (R53 82)   6  Constipation (564 00) (K59 00)   7  Dysuria (788 1) (R30 0)   8  Encounter for routine gynecological examination (V72 31) (Z01 419)   9  Hair thinning (704 00) (L65 9)   10  Neck muscle spasm (728 85) (M62 838)   11  Scoliosis (737 30) (M41 9)   12  Screening for STD (sexually transmitted disease) (V74 5) (Z11 3)   13  Vaginal candidiasis (112 1) (B37 3)    Family History  Father    · Family history of diabetes mellitus (V18 0) (Z83 3)    Social History    · Never a smoker   · No drug use    Current Meds   1  Cyclobenzaprine HCl - 5 MG Oral Tablet; TAKE 1 TABLET AT BEDTIME; Therapy: 55BQW6821 to (Evaluate:59Ugc0144)  Requested for: 47PDT6362; Last   Rx:10Jan2017 Ordered    Allergies    1  No Known Drug Allergies    Vitals   Recorded: 71KKS3930 01:53PM   Heart Rate 82   Respiration 16   Systolic 912   Diastolic 50   Height 5 ft 5 in   Weight 119 lb    BMI Calculated 19 8   BSA Calculated 1 59     Physical Exam    Constitutional   General appearance: No acute distress, well appearing and well nourished  Head and Face   Head and face: Normal     Palpation of the face and sinuses: No sinus tenderness  Eyes   Conjunctiva and lids: No swelling, erythema or discharge  Pupils and irises: Equal, round, reactive to light  Ophthalmoscopic examination: Normal fundi and optic discs      Ears, Nose, Mouth, and Throat   External inspection of ears and nose: Normal     Otoscopic examination: Tympanic membranes translucent with normal light reflex  Canals patent without erythema  Hearing: Normal     Nasal mucosa, septum, and turbinates: Normal without edema or erythema  Lips, teeth, and gums: Normal, good dentition  Oropharynx: Normal with no erythema, edema, exudate or lesions  Neck   Neck: Supple, symmetric, trachea midline, no masses  Thyroid: Normal, no thyromegaly  Pulmonary   Respiratory effort: No increased work of breathing or signs of respiratory distress  Percussion of chest: Normal     Palpation of chest: Normal     Auscultation of lungs: Clear to auscultation  Cardiovascular   Palpation of heart: Normal PMI, no thrills  Auscultation of heart: Normal rate and rhythm, normal S1 and S2, no murmurs  Chest   Palpation of breasts and axillae: Normal, no masses palpated  Abdomen   Abdomen: Non-tender, no masses  Liver and spleen: No hepatomegaly or splenomegaly  Examination for hernias: No hernia appreciated  Lymphatic   Palpation of lymph nodes in neck: No lymphadenopathy  Palpation of lymph nodes in axillae: No lymphadenopathy  Musculoskeletal   Gait and station: Normal     Digits and nails: Normal without clubbing or cyanosis  Muscle strength/tone: Normal     Skin   Skin and subcutaneous tissue: Normal without rashes or lesions  Palpation of skin and subcutaneous tissue: Normal turgor  Neurologic   Cranial nerves: Cranial nerves II-XII intact  Cortical function: Normal mental status      Psychiatric   Judgment and insight: Normal     Orientation to person, place, and time: Normal     Mood and affect: Normal        Procedure    Procedure:   Results: 20/20 in the right eye with corrective device, 20/20 in the left eye with corrective device      Signatures   Electronically signed by : MARTHA Floyd ; May  1 2017  2:24PM EST                       (Author)

## 2018-01-16 NOTE — RESULT NOTES
Verified Results  (1) QUANTIFERON - TB GOLD 97Kgl9500 01:20PM Iris Baxter Order Number: [id number]     Test Name Result Flag Reference   QUANTIFERON TB GOLD      Specimen incubated at [place], [place]    Performed at:  [address] [place] [address]  [id number]  : Cammy Aguilar MD, Phone:  [phone]   QUANTIFERON TB GOLD Negative  Negative   QUANTIFERON CRITERIA Comment     To be considered positive a specimen should have a TB Ag minus Nil  value greater than or equal to 0 35 IU/mL and in addition the TB Ag  minus Nil value must be greater than or equal to 25% of the Nil  value  There may be insufficient information in these values to  differentiate between some negative and some indeterminate test  values  QUANTIFERON TB AG VALUE 0 07 IU/mL     QUANTIFERON NIL VALUE 0 05 IU/mL     QUANTIFERON MITOGEN VALUE 9 40 IU/mL     QFT TB AG MINUS NIL VALUE 0 02 IU/mL     QFT INTERPRETATION Comment     The QuantiFERON TB Gold (in Tube) assay is intended for use as an aid  in the diagnosis of TB infection  Negative results suggest that there  is no TB infection  In patients with high suspicion of exposure, a  negative test should be repeated  A positive test indicates infection  with Mycobacterium tuberculosis  Among individuals without  tuberculosis infection, a positive test may be due to exposure to  [place], M  bob or M  marinum  On the Internet, go to  cdc gov/tb for further details      Performed at:  [address] [place] [address]  [id number]  : Cammy Aguilar MD, Phone:  [phone]

## 2018-01-17 NOTE — RESULT NOTES
Discussion/Summary   This time urine osmolality is low, previously it was high, and serum osmolality is normal,   So there is no concern, it seems like this is due to the hydration status, the numbers are variable     If you have any concern or question please follow up     Verified Results  (1) OSMOLALITY, URINE [id number] 01:12PM [facility] Number: [id number]     Test Name Result Flag Reference   OSMOL, URINE 218 mmol/KG L 250-900     (1) OSMOLALITY, SERUM [id number] 01:12PM Shaye Eastern Order Number: [id number]     Test Name Result Flag Reference   OSMOL, SERUM 283 mmol/KG  282-298

## 2018-01-17 NOTE — RESULT NOTES
Verified Results  (1) HEP B SURFACE ANTIBODY 13Jun2017 08:50AM Iris  Order Number: [id number]     Test Name Result Flag Reference   HEPATITIS B SURFACE ANTIBODY 826 09 mIU/mL     Protective Immunity: Hep B Surface Antibody >= 10 mIu/ml (Traceable to [facility] International Reference Preparation)     (1) CBC/PLT/DIFF [id number] 08:50AM [facility]  Order Number: [id number]     Test Name Result Flag Reference   WBC COUNT 8 61 Thousand/uL  4 31-10 16   RBC COUNT 4 37 Million/uL  3 81-5 12   HEMOGLOBIN 13 0 g/dL  11 5-15 4   HEMATOCRIT 39 7 %  34 8-46  1   MCV 91 fL  82-98   MCH 29 7 pg  26 8-34 3   MCHC 32 7 g/dL  31 4-37 4   RDW 12 3 %  11 6-15 1   MPV 9 7 fL  8 9-12 7   PLATELET COUNT 962 Thousands/uL  149-390   nRBC AUTOMATED 0 /100 WBCs     NEUTROPHILS RELATIVE PERCENT 49 %  43-75   LYMPHOCYTES RELATIVE PERCENT 35 %  14-44   MONOCYTES RELATIVE PERCENT 11 %  4-12   EOSINOPHILS RELATIVE PERCENT 4 %  0-6   BASOPHILS RELATIVE PERCENT 1 %  0-1   NEUTROPHILS ABSOLUTE COUNT 4 26 Thousands/? ??L  1 85-7 62   LYMPHOCYTES ABSOLUTE COUNT 3 00 Thousands/? ??L  0 60-4 47   MONOCYTES ABSOLUTE COUNT 0 90 Thousand/? ??L  0 17-1 22   EOSINOPHILS ABSOLUTE COUNT 0 37 Thousand/? ??L  0 00-0 61   BASOPHILS ABSOLUTE COUNT 0 05 Thousands/? ??L  0 00-0 10     (1) COMPREHENSIVE METABOLIC PANEL [id number] 86:08ZV [facility] Order Number: [id number]     Test Name Result Flag Reference   SODIUM 140 mmol/L  136-145   POTASSIUM 3 7 mmol/L  3 5-5 3   CHLORIDE 103 mmol/L  100-108   CARBON DIOXIDE 30 mmol/L  21-32   ANION GAP (CALC) 7 mmol/L  4-13   BLOOD UREA NITROGEN 12 mg/dL  5-25   CREATININE 0 48 mg/dL L 0 60-1 30   Standardized to IDMS reference method   CALCIUM 8 8 mg/dL  8 3-10 1   BILI, TOTAL 0 56 mg/dL  0 20-1 00   ALK PHOSPHATAS 60 U/L     ALT (SGPT) 22 U/L  12-78   AST(SGOT) 12 U/L  5-45   ALBUMIN 3 4 g/dL L 3 5-5 0   TOTAL PROTEIN 6 8 g/dL  6 4-8 2   eGFR Non-      >60 0 ml/min/1 73sq m   [facility] [employer] Disease Education Program recommendations are as follows:  GFR calculation is accurate only with a steady state creatinine  Chronic Kidney disease less than 60 ml/min/1 73 sq  meters  Kidney failure less than 15 ml/min/1 73 sq  meters  GLUCOSE FASTING 84 mg/dL  65-99     (1) TSH [id number] 08:50AM Beckie Villanueva Order Number: [id number]     Test Name Result Flag Reference   TSH 0 617 uIU/mL  0 358-3 740   Patients undergoing fluorescein dye angiography may retain small amounts of fluorescein in the body for 48-72 hours post procedure  Samples containing fluorescein can produce falsely depressed TSH values  If the patient had this procedure,a specimen should be resubmitted post fluorescein clearance            The recommended reference ranges for TSH during pregnancy are as follows:  First trimester 0 1 to 2 5 uIU/mL  Second trimester  0 2 to 3 0 uIU/mL  Third trimester 0 3 to 3 0 uIU/m

## 2018-01-22 VITALS
SYSTOLIC BLOOD PRESSURE: 120 MMHG | BODY MASS INDEX: 19.83 KG/M2 | DIASTOLIC BLOOD PRESSURE: 60 MMHG | WEIGHT: 119 LBS | HEART RATE: 72 BPM | RESPIRATION RATE: 16 BRPM | HEIGHT: 65 IN

## 2018-01-22 VITALS
SYSTOLIC BLOOD PRESSURE: 100 MMHG | HEART RATE: 82 BPM | DIASTOLIC BLOOD PRESSURE: 50 MMHG | BODY MASS INDEX: 19.83 KG/M2 | HEIGHT: 65 IN | WEIGHT: 119 LBS | RESPIRATION RATE: 16 BRPM

## 2018-01-23 VITALS
DIASTOLIC BLOOD PRESSURE: 62 MMHG | HEIGHT: 61 IN | SYSTOLIC BLOOD PRESSURE: 102 MMHG | WEIGHT: 124 LBS | BODY MASS INDEX: 23.41 KG/M2 | RESPIRATION RATE: 16 BRPM

## 2018-01-23 NOTE — PROCEDURES
Procedures by Eleni Lopez MD at  2017  3:11 PM      Author:  Eleni Lopez MD Service:  Neurology Author Type:  Physician    Filed:  2017  3:15 PM Date of Service:  2017  3:11 PM Status:  Signed    :  Eleni Lopez MD (Physician)        Procedure Orders:       1  EEG Routine and awake [id number] ordered by  at [address]    Patient Name:  Eric Ramos  MRN: [id number]   :  1995 File #: [facility]    Age: 25 y o  Encounter #: [id number]   Date performed: 2017 Referring Provider: Todd Rodriguez MD          Report date: 2017          Study type: awake and drowsy EEG    ICD 10 diagnosis: Transient alteration of awareness R40 4 and Episode of transient neurological symptoms R29 90    Patient History:  EEG is requested to assess for seizures and/or classification of epilepsy  Patient is 25 y o  female who had an episode of loss of vision, weakness, and loss of consciousness  She had a similar episode two weeks prior to this visit without  loss of consciousness  Current AEDs: None  Medications include:     Description of Procedure: The EEG was performed with electrodes applied using the International 10-20 System  Additional electrodes used included EOG, ECG and T1/T2 electrodes  A single lead ECG channel is also  present  At least 16 channels are reviewed at a time  and formatted into longitudinal bipolar, transverse bipolar, and referential (to common reference or calculated common reference) montages  The EEG was recorded  with the patient awake and drowsy  The recording was technically satisfactory  EEG was recorded from 11:10 to 11:41  Findings:   Background Activity: The background is grossly symmetric with respect to voltages and activities  During wakefulness, the background is well-organized with anterior very  low amplitude beta activity and very low amplitude posterior alpha activity  There is a symmetric 10-10 5  Hz posterior dominant rhythm that attenuates with eye opening  Drowsiness is characterized by attentuation of the alpha rhythm and prominent anterior beta activity  Activation Procedures:   Hyperventilation was performed with good effort up to 4 minutes and did not produce any abnormalities  Stepped photic stimulation from 1 to 30 fps was performed and produced no abnormality  Other findings: The single lead ECG shows a regular and sinus rhythm  Interpretation: This is a normal 31 minutes awake and drowsy EEG  MD [facility]  Yasmin SHELL    Dec 26 2017  3:15PM [facility] Standard Time

## 2018-03-07 NOTE — PROGRESS NOTES
Dear Heladio Fox       My name is Hema Velazquez  I am a Registered Nurse who works for [employer] [employer]  In reviewing your records  I come across frequent visits to the emergency department  I have enclosed information about our [address] [facility] facilities  You will find what is treated here as well as the address and phone numbers  They are a good alternative to the  emergency department and would like you to consider them for your future needs  Any questions you may have I am here to help, feel free to contact me           Sincerely,  Hema Velazquez, RN  Care Coordinator  [facility]  [phone]        Electronically signed by:Lisa Guerrier   Sep 26 2016  2:41PM EST

## 2018-03-23 ENCOUNTER — HOSPITAL ENCOUNTER (EMERGENCY)
Facility: HOSPITAL | Age: 23
Discharge: HOME | End: 2018-03-23
Attending: EMERGENCY MEDICINE | Admitting: EMERGENCY MEDICINE
Payer: COMMERCIAL

## 2018-03-23 VITALS
HEART RATE: 73 BPM | WEIGHT: 118 LBS | SYSTOLIC BLOOD PRESSURE: 111 MMHG | DIASTOLIC BLOOD PRESSURE: 60 MMHG | RESPIRATION RATE: 16 BRPM | TEMPERATURE: 97.5 F | BODY MASS INDEX: 23.16 KG/M2 | OXYGEN SATURATION: 99 % | HEIGHT: 60 IN

## 2018-03-23 DIAGNOSIS — E16.2 HYPOGLYCEMIA: Primary | ICD-10-CM

## 2018-03-23 LAB
BACTERIA UR QL AUTO: ABNORMAL /HPF
BILIRUB UR QL STRIP: NEGATIVE
CLARITY UR: CLEAR
COLOR UR: YELLOW
EXT PREG TEST URINE: NEGATIVE
GLUCOSE SERPL-MCNC: 143 MG/DL (ref 65–140)
GLUCOSE SERPL-MCNC: 243 MG/DL (ref 65–140)
GLUCOSE UR STRIP-MCNC: NEGATIVE MG/DL
HGB UR QL STRIP.AUTO: NEGATIVE
HYALINE CASTS #/AREA URNS LPF: ABNORMAL /LPF
KETONES UR STRIP-MCNC: NEGATIVE MG/DL
LEUKOCYTE ESTERASE UR QL STRIP: ABNORMAL
NITRITE UR QL STRIP: NEGATIVE
NON-SQ EPI CELLS URNS QL MICRO: ABNORMAL /HPF
PH UR STRIP.AUTO: 7 [PH] (ref 4.5–8)
PROT UR STRIP-MCNC: NEGATIVE MG/DL
RBC #/AREA URNS AUTO: ABNORMAL /HPF
SP GR UR STRIP.AUTO: 1.02 (ref 1–1.03)
UROBILINOGEN UR QL STRIP.AUTO: 1 E.U./DL
WBC #/AREA URNS AUTO: ABNORMAL /HPF

## 2018-03-23 PROCEDURE — 87086 URINE CULTURE/COLONY COUNT: CPT

## 2018-03-23 PROCEDURE — 82948 REAGENT STRIP/BLOOD GLUCOSE: CPT

## 2018-03-23 PROCEDURE — 99285 EMERGENCY DEPT VISIT HI MDM: CPT

## 2018-03-23 PROCEDURE — 81001 URINALYSIS AUTO W/SCOPE: CPT

## 2018-03-23 PROCEDURE — 81025 URINE PREGNANCY TEST: CPT | Performed by: EMERGENCY MEDICINE

## 2018-03-23 NOTE — DISCHARGE INSTRUCTIONS
Non-diabetic Hypoglycemia   WHAT YOU NEED TO KNOW:   Non-diabetic hypoglycemia is a condition that causes the sugar (glucose) in your blood to drop too low  This can happen in people who do not have diabetes  The 2 types of non-diabetic hypoglycemia are fasting hypoglycemia and reactive hypoglycemia  Fasting hypoglycemia often happens after a person goes without food for 8 hours or longer  Reactive hypoglycemia usually happens about 2 to 4 hours after a meal  When your blood sugar level is low, your muscles and brain cells do not have enough energy to work well  DISCHARGE INSTRUCTIONS:   Follow up with your healthcare provider as directed:  Write down your questions so you remember to ask them during your visits  Keep carbohydrates with you:  Carbohydrates will raise your blood sugar level when you have symptoms of hypoglycemia  Carbohydrates are found in bread, rice, cereal, fruits, juice, and milk  Prevent hypoglycemia:  You may need to change what and when you eat to prevent low blood sugar levels  Follow the meal plan that you and the dietitian have created  The following guidelines may help you keep your blood sugar levels under control  · Eat 5 to 6 small meals each day instead of 3 large meals  Eat the same amount of carbohydrate at meals and snacks each day  Most people need about 3 to 4 servings of carbohydrate at meals and 1 to 2 servings for snacks  Do not skip meals  Carbohydrate counting can be used plan your meals  Ask your healthcare provider or dietitian for information about carbohydrate counting  · Limit refined carbohydrates  Examples are white bread, pastries (pies and cakes), regular sodas, syrups, and candy  · Do not have drinks or foods that contain caffeine  Examples are coffee, tea, and certain types of sodas  Caffeine may cause you to have the same symptoms as hypoglycemia, and may cause you to feel worse  · Limit or do not drink alcohol    Women should limit alcohol to 1 drink a day  Men should limit alcohol to 2 drinks a day  A drink of alcohol is 12 ounces of beer, 5 ounces of wine, or 1½ ounces of liquor  Do not drink alcohol on an empty stomach  Drink alcohol with meals to prevent hypoglycemia  · Include protein foods and vegetables in your meals  Some foods that are high in protein include beef, pork, fish, poultry (chicken and turkey), beans, and nuts  Eat a variety of vegetables with your meals  Contact your healthcare provider if:   · You have blurred vision or vision changes  · You feel very tired and weak  · You are sweating more than usual for you  · You have a fast heartbeat  · You feel dizzy, lightheaded, and shaky  · You have questions about your condition or care  Return to the emergency department if:   · You have symptoms of hypoglycemia and cannot eat  · You have trouble thinking clearly  · You have a seizure or faint  © 2017 [employer]  Information is for End User's use only and may not be sold, redistributed or otherwise used for commercial purposes  All illustrations and images included in CareNotes® are the copyrighted property of A [employer] M [employer]  or [employer]  The above information is an  only  It is not intended as medical advice for individual conditions or treatments  Talk to your doctor, nurse or pharmacist before following any medical regimen to see if it is safe and effective for you

## 2018-03-23 NOTE — ED ATTENDING ATTESTATION
Dov Diaz DO, saw and evaluated the patient  I have discussed the patient with the resident/non-physician practitioner and agree with the resident's/non-physician practitioner's findings, Plan of Care, and MDM as documented in the resident's/non-physician practitioner's note, except where noted  All available labs and Radiology studies were reviewed  At this point I agree with the current assessment done in the Emergency Department  I have conducted an independent evaluation of this patient a history and physical is as follows:    25 yof with hypoglycemia  At school she had dark vision, shakiness and had hypoglycemia [facility]  Undergoing evaluation as outpatient for this condition  Past Medical History:   Diagnosis Date    Scoliosis      /60 (BP Location: Right arm)   Pulse 73   Temp 97 5 °F (36 4 °C) (Tympanic)   Resp 16   Ht 5' (1 524 m)   Wt 53 5 kg (118 lb)   LMP 03/02/2018   SpO2 99%   BMI 23 05 kg/m²   Exam unremarkable    Feed, recheck glucose in one hour  Patient states she feels fine now  She has experience with this condition and is comfortable managing at home  She would like to be discharged  I asked her if she had taken any of her parents assault final urea as because her prior laboratory evaluation was consistent with this given she had a normal C-peptide, normal insulin  She denied this and she seems very reasonable and honest   She will follow up with her doctor and continue her outpatient evaluation      DC for endo f/u    Diagnosis  Hypoglycemia of unknown cause              Critical Care Time  CritCare Time    Procedures

## 2018-03-23 NOTE — ED PROVIDER NOTES
History  Chief Complaint   Patient presents with    Hypoglycemia - Symptomatic     Patient was at school taking a test, got sweaty, blurred vision, sugar 52, had granola bar and EMS rechecked- 110  Patient states it has been happening and she is still in the process of figuring it out  HPI   26 yo female presenting ot ER for evauation of hypoglycemia, symptomatic  Patient was in class when she had an episode of blackening of her visual field for about 3 minutes along with feeling very shaky, they checked her sugar at school and it was 52  She was given PO snacks and EMS was called for ER evaluation  Patient repeat glucose was 148 in the ER  Currently, patient is asymptomatic and states she feels like her normal self  Since last year, patient has had multiple episdoes of hypoglycemia, with the lowest going down to 32  Patient has had blood work done in addition to [address] of the abdomen which has been normal so far  She had MRI of her head and EEG done for these spells and they were negative as well  Patient does not have medical problems, and states she takes no medications  FH is positive for DM2  Patient denies any recent illness, no recent travel  Patient denies smoking, drug use and no alcohol use  None       Past Medical History:   Diagnosis Date    Scoliosis        History reviewed  No pertinent surgical history  History reviewed  No pertinent family history  I have reviewed and agree with the history as documented  Social History   Substance Use Topics    Smoking status: Never Smoker    Smokeless tobacco: Never Used    Alcohol use No        Review of Systems    Constitutional: Negative for appetite change, chills and fever  HENT: Negative for congestion, rhinorrhea and sore throat  Eyes: Negative for photophobia, pain and visual disturbance  Respiratory: Negative for cough, chest tightness and shortness of breath      Cardiovascular: Negative for chest pain, palpitations and leg swelling  Gastrointestinal: Negative for abdominal pain, diarrhea, nausea and vomiting  Genitourinary: Negative for dysuria, flank pain and hematuria  Musculoskeletal: Negative for back pain, neck pain and neck stiffness  Skin: Negative for color change, rash and wound  Neurological: Negative for dizziness, numbness and headaches  All other systems reviewed and are negative      Physical Exam  ED Triage Vitals [03/23/18 1253]   Temperature Pulse Respirations Blood Pressure SpO2   97 5 °F (36 4 °C) 73 16 111/60 99 %      Temp Source Heart Rate Source Patient Position - Orthostatic VS BP Location FiO2 (%)   Tympanic Monitor Sitting Right arm --      Pain Score       6           Orthostatic Vital Signs  Vitals:    03/23/18 1253   BP: 111/60   Pulse: 73   Patient Position - Orthostatic VS: Sitting       Physical Exam  /60 (BP Location: Right arm)   Pulse 73   Temp 97 5 °F (36 4 °C) (Tympanic)   Resp 16   Ht 5' (1 524 m)   Wt 53 5 kg (118 lb)   LMP 03/02/2018   SpO2 99%   BMI 23 05 kg/m²     General Appearance:  Alert, cooperative, no distress   Head:  Normocephalic, without obvious abnormality, atraumatic   Eyes:  PERRL, conjunctiva/corneas clear, EOM's intact, visual field intact bilaterally       Nose: Nares normal, septum midline,mucosa normal, no drainage or sinus tenderness   Throat: Lips, mucosa, and tongue normal; teeth and gums normal   Neck: Supple, symmetrical, trachea midline, no adenopathy   Back:   Symmetric, no curvature, ROM normal, no CVA tenderness   Lungs:   Clear to auscultation bilaterally, respirations unlabored   Heart:  Regular rate and rhythm, S1 and S2 normal, no murmur, rub, or gallop   Abdomen:   Soft, non-tender, bowel sounds active all four quadrants   Pelvic: Deferred   Extremities: Extremities normal, atraumatic, no cyanosis or edema   Pulses: 2+ and symmetric   Skin: Skin color, texture, turgor normal, no rashes or lesions   Neurologic:      Psychiatric: Moves all extremities, sensation and strength in tact in all extremities    Normal mood and affect       ED Medications  Medications - No data to display    Diagnostic Studies  Results Reviewed     Procedure Component Value Units Date/Time    Urine Microscopic [[id number]]  (Abnormal) Collected:  03/23/18 1434    Lab Status:  Final result Specimen:  Urine from Urine, Clean Catch Updated:  03/23/18 1554     RBC, UA 2-4 (A) /hpf      WBC, UA 20-30 (A) /hpf      Epithelial Cells Occasional /hpf      Bacteria, UA Occasional /hpf      Hyaline Casts, UA None Seen /lpf     Urine culture [[id number]] Collected:  03/23/18 1434    Lab Status:   In process Specimen:  Urine from Urine, Clean Catch Updated:  03/23/18 1554    POCT pregnancy, urine [[id number]]  (Normal) Resulted:  03/23/18 1433    Lab Status:  Final result Updated:  03/23/18 1433     EXT PREG TEST UR (Ref: Negative) negative    Fingerstick Glucose (POCT) [[id number]]  (Abnormal) Collected:  03/23/18 1432    Lab Status:  Final result Updated:  03/23/18 1433     POC Glucose 243 (H) mg/dl     ED Urine Macroscopic [[id number]]  (Abnormal) Collected:  03/23/18 1434    Lab Status:  Final result Specimen:  Urine Updated:  03/23/18 1433     Color, UA Yellow     Clarity, UA Clear     pH, UA 7 0     Leukocytes, UA Small (A)     Nitrite, UA Negative     Protein, UA Negative mg/dl      Glucose, UA Negative mg/dl      Ketones, UA Negative mg/dl      Urobilinogen, UA 1 0 E U /dl      Bilirubin, UA Negative     Blood, UA Negative     Specific Gravity, UA 1 020    Narrative:       CLINITEK RESULT    Fingerstick Glucose (POCT) [[id number]]  (Abnormal) Collected:  03/23/18 1304    Lab Status:  Final result Updated:  03/23/18 1305     POC Glucose 143 (H) mg/dl                  No orders to display         Procedures  Procedures      Phone Consults  ED Phone Contact    ED Course  ED Course        MDM   Patient currently with symptomatic hypoglycemia without history of DM, currently feeling well with normal glucose  Will continue PO snacks and recheck glucose in 1 hour  If normal, will DC home with f/u to PCP and endocrinology  CritCare Time    Disposition  Final diagnoses:   Hypoglycemia     Time reflects when diagnosis was documented in both MDM as applicable and the Disposition within this note     Time User Action Codes Description Comment    3/23/2018  3:07 PM Namrata Alert Add [E16 2] Hypoglycemia       ED Disposition     ED Disposition Condition Comment    Discharge  Yolanda Leon discharge to home/self care  Condition at discharge: Stable        Follow-up Information     Follow up With Specialties Details Why Contact Info    Sonu Culp MD Family Medicine Go in 2 days  [address]  [facility] [address]      [address] [facility] Call in 3 days  [address] [place] [address]  [phone]        There are no discharge medications for this patient  No discharge procedures on file  ED Provider  Attending physically available and evaluated Yolanda Leon I managed the patient along with the ED Attending      Electronically Signed by         Vida Harrell MD  03/23/18 7045

## 2018-03-24 LAB — BACTERIA UR CULT: NORMAL

## 2018-03-28 RX ORDER — NORETHINDRONE ACETATE AND ETHINYL ESTRADIOL 1MG-20(21)
1 KIT ORAL DAILY
COMMUNITY
Start: 2017-06-13 | End: 2021-05-19

## 2018-03-29 ENCOUNTER — OFFICE VISIT (OUTPATIENT)
Dept: FAMILY MEDICINE CLINIC | Facility: CLINIC | Age: 23
End: 2018-03-29
Payer: COMMERCIAL

## 2018-03-29 VITALS
OXYGEN SATURATION: 98 % | DIASTOLIC BLOOD PRESSURE: 60 MMHG | WEIGHT: 120 LBS | HEART RATE: 98 BPM | BODY MASS INDEX: 23.56 KG/M2 | SYSTOLIC BLOOD PRESSURE: 100 MMHG | HEIGHT: 60 IN | RESPIRATION RATE: 16 BRPM

## 2018-03-29 DIAGNOSIS — E16.2 HYPOGLYCEMIA: Primary | ICD-10-CM

## 2018-03-29 DIAGNOSIS — R42 DIZZINESS: ICD-10-CM

## 2018-03-29 PROBLEM — K59.09 CHRONIC CONSTIPATION: Status: ACTIVE | Noted: 2017-10-02

## 2018-03-29 PROBLEM — R55 SYNCOPE AND COLLAPSE: Status: ACTIVE | Noted: 2017-12-07

## 2018-03-29 PROBLEM — E16.1 FASTING HYPOGLYCEMIA: Status: ACTIVE | Noted: 2017-10-02

## 2018-03-29 PROCEDURE — 3008F BODY MASS INDEX DOCD: CPT | Performed by: FAMILY MEDICINE

## 2018-03-29 PROCEDURE — 99214 OFFICE O/P EST MOD 30 MIN: CPT | Performed by: FAMILY MEDICINE

## 2018-03-29 NOTE — PROGRESS NOTES
Assessment/Plan:    Problem List Items Addressed This Visit        Endocrine    Hypoglycemia - Primary     She gets on and off episodes of hypoglycemia which is symptomatic and she immediately takes high sugar food so she gets better, she brought forms for her school and for her work whether she can work or not, I filled the forms for her that she can continue her education and her work without any limitation as she knows how to control her symptoms but taking extra food and I will do her extra blood work including glucose tolerance test and then she will follow here and then with endocrinologist          Relevant Orders    CBC and differential    Comprehensive metabolic panel    HEMOGLOBIN A1C W/ EAG ESTIMATION    Glucose Tolerance Test, 3 Specimens, (75G)       Other    Dizziness          Chief Complaint   Patient presents with    Follow-up     from er       Subjective:   Patient ID: Zhen Langston is a 25 y o  female  She gets episodes of hypoglycemia 1-2 times per week and she monitors her blood sugar every day and she says sometimes it goes below 50 without eating and once she eats her blood sugar gets better, she says that on March 23rd she had a blood fast and she was in her school setting started feeling low blood sugar with shakiness and she has felt dizzy and could not see   Her friend gave her food and then ambulance was call and she was taken to the ER they checked her sugar multiple times and it keeps increasing as she had multiple food intake since then,  She works for [employer] and now she brought papers to be filled whether she can work and 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,   she had previously blood work ultrasound of abdomen and neurology evaluation and no significant finding   she has also made appointment with endocrinologist which is in July,        Review of Systems   Constitutional: Negative for activity change, appetite change, chills, diaphoresis, fatigue, fever and unexpected weight change  HENT: Negative for congestion, dental problem, ear discharge, ear pain, facial swelling, hearing loss, mouth sores, nosebleeds, postnasal drip, rhinorrhea, sinus pain, sinus pressure, sneezing, sore throat, trouble swallowing and voice change  Eyes: Negative for photophobia, pain, discharge, redness and itching  Respiratory: Negative for cough, chest tightness, shortness of breath and wheezing  Cardiovascular: Negative for chest pain, palpitations and leg swelling  Gastrointestinal: Negative for abdominal distention, abdominal pain, blood in stool, constipation, diarrhea and nausea  Endocrine: Negative for cold intolerance, heat intolerance, polydipsia, polyphagia and polyuria  Positive for hypoglycemic episodes   Genitourinary: Negative for dysuria, flank pain, frequency, hematuria and urgency  Musculoskeletal: Negative for arthralgias, back pain, myalgias and neck pain  Skin: Negative for color change and pallor  Allergic/Immunologic: Negative for environmental allergies and food allergies  Neurological: Negative for dizziness, weakness, light-headedness, numbness and headaches  Hematological: Negative for adenopathy  Does not bruise/bleed easily  Psychiatric/Behavioral: Negative for behavioral problems, sleep disturbance and suicidal ideas  The patient is not nervous/anxious  Objective:  Physical Exam   Constitutional: She is oriented to person, place, and time  She appears well-developed and well-nourished  HENT:   Head: Normocephalic and atraumatic  Right Ear: External ear normal    Left Ear: External ear normal    Nose: Nose normal    Mouth/Throat: Oropharynx is clear and moist  No oropharyngeal exudate  Eyes: Conjunctivae and EOM are normal  Pupils are equal, round, and reactive to light  Right eye exhibits no discharge  Left eye exhibits no discharge  No scleral icterus  Neck: Normal range of motion  Neck supple   No tracheal deviation present  No thyromegaly present  Cardiovascular: Normal rate, regular rhythm and normal heart sounds  No murmur heard  Pulmonary/Chest: Effort normal and breath sounds normal  No respiratory distress  She has no wheezes  She has no rales  Abdominal: Soft  Bowel sounds are normal  She exhibits no distension and no mass  There is no tenderness  There is no rebound  Musculoskeletal: Normal range of motion  She exhibits no edema  Lymphadenopathy:     She has no cervical adenopathy  Neurological: She is alert and oriented to person, place, and time  She has normal reflexes  No cranial nerve deficit  Skin: Skin is warm  No rash noted  No erythema  No pallor  Psychiatric: She has a normal mood and affect  Her behavior is normal  Judgment and thought content normal    Nursing note and vitals reviewed  No past surgical history on file      Family History   Problem Relation Age of Onset    Diabetes Father          Current Outpatient Prescriptions:     norethindrone-ethinyl estradiol (JUNEL FE 1/20) 1-20 MG-MCG per tablet, Take 1 tablet by mouth daily, Disp: , Rfl:     Allergies   Allergen Reactions    Other Hives     Peaches  Rats       Vitals:    03/29/18 1023   BP: 100/60   Pulse: 98   Resp: 16   SpO2: 98%   Weight: 54 4 kg (120 lb)   Height: 5' (1 524 m)

## 2018-03-30 ENCOUNTER — TELEPHONE (OUTPATIENT)
Dept: FAMILY MEDICINE CLINIC | Facility: CLINIC | Age: 23
End: 2018-03-30

## 2018-03-30 DIAGNOSIS — E16.2 HYPOGLYCEMIA: Primary | ICD-10-CM

## 2018-03-30 NOTE — ASSESSMENT & PLAN NOTE
She gets on and off episodes of hypoglycemia which is symptomatic and she immediately takes high sugar food so she gets better, she brought forms for her school and for her work whether she can work or not, I filled the forms for her that she can continue her education and her work without any limitation as she knows how to control her symptoms but taking extra food and I will do her extra blood work including glucose tolerance test and then she will follow here and then with endocrinologist

## 2018-05-08 ENCOUNTER — TELEPHONE (OUTPATIENT)
Dept: FAMILY MEDICINE CLINIC | Facility: CLINIC | Age: 23
End: 2018-05-08

## 2018-05-08 NOTE — TELEPHONE ENCOUNTER
TIFFANY IS REQUESTING A LETTER FOR HER GYM THAT SHE CAN NOT CONTINUE TO GO BECAUSE OF HER HYPOGLYCEMIA  DROP IN BLOOD SUGAR WHEN SHE WORKS OUT  STATES SHE DISCUSSED THIS WITH DR RODAS AT HER VISIT

## 2018-07-25 ENCOUNTER — APPOINTMENT (OUTPATIENT)
Dept: LAB | Facility: CLINIC | Age: 23
End: 2018-07-25
Payer: COMMERCIAL

## 2018-07-25 ENCOUNTER — TRANSCRIBE ORDERS (OUTPATIENT)
Dept: LAB | Facility: CLINIC | Age: 23
End: 2018-07-25

## 2018-07-25 DIAGNOSIS — L65.9 LOSS OF HAIR: ICD-10-CM

## 2018-07-25 DIAGNOSIS — R63.4 LOSS OF WEIGHT: Primary | ICD-10-CM

## 2018-07-25 DIAGNOSIS — N92.6 IRREGULAR MENSTRUAL CYCLE: ICD-10-CM

## 2018-07-25 DIAGNOSIS — E16.2 HYPOGLYCEMIA, UNSPECIFIED: ICD-10-CM

## 2018-07-25 DIAGNOSIS — R63.4 LOSS OF WEIGHT: ICD-10-CM

## 2018-07-25 LAB
25(OH)D3 SERPL-MCNC: 10.9 NG/ML (ref 30–100)
ALBUMIN SERPL BCP-MCNC: 3.9 G/DL (ref 3.5–5)
ALP SERPL-CCNC: 71 U/L (ref 46–116)
ALT SERPL W P-5'-P-CCNC: 26 U/L (ref 12–78)
ANION GAP SERPL CALCULATED.3IONS-SCNC: 8 MMOL/L (ref 4–13)
AST SERPL W P-5'-P-CCNC: 17 U/L (ref 5–45)
BASOPHILS # BLD AUTO: 0.02 THOUSANDS/ΜL (ref 0–0.1)
BASOPHILS NFR BLD AUTO: 0 % (ref 0–1)
BILIRUB SERPL-MCNC: 1 MG/DL (ref 0.2–1)
BUN SERPL-MCNC: 11 MG/DL (ref 5–25)
CALCIUM SERPL-MCNC: 8.4 MG/DL (ref 8.3–10.1)
CHLORIDE SERPL-SCNC: 105 MMOL/L (ref 100–108)
CHOLEST SERPL-MCNC: 116 MG/DL (ref 50–200)
CK SERPL-CCNC: 129 U/L (ref 26–192)
CO2 SERPL-SCNC: 28 MMOL/L (ref 21–32)
CREAT SERPL-MCNC: 0.79 MG/DL (ref 0.6–1.3)
CRP SERPL QL: <3 MG/L
EOSINOPHIL # BLD AUTO: 0.07 THOUSAND/ΜL (ref 0–0.61)
EOSINOPHIL NFR BLD AUTO: 1 % (ref 0–6)
ERYTHROCYTE [DISTWIDTH] IN BLOOD BY AUTOMATED COUNT: 11.9 % (ref 11.6–15.1)
ERYTHROCYTE [SEDIMENTATION RATE] IN BLOOD: 2 MM/HOUR (ref 0–20)
EST. AVERAGE GLUCOSE BLD GHB EST-MCNC: 100 MG/DL
GFR SERPL CREATININE-BSD FRML MDRD: 106 ML/MIN/1.73SQ M
GLUCOSE SERPL-MCNC: 83 MG/DL (ref 65–140)
HBA1C MFR BLD: 5.1 % (ref 4.2–6.3)
HCT VFR BLD AUTO: 36 % (ref 34.8–46.1)
HDLC SERPL-MCNC: 68 MG/DL (ref 40–60)
HGB BLD-MCNC: 12.1 G/DL (ref 11.5–15.4)
LDLC SERPL CALC-MCNC: 42 MG/DL (ref 0–100)
LYMPHOCYTES # BLD AUTO: 2.97 THOUSANDS/ΜL (ref 0.6–4.47)
LYMPHOCYTES NFR BLD AUTO: 36 % (ref 14–44)
MCH RBC QN AUTO: 30.2 PG (ref 26.8–34.3)
MCHC RBC AUTO-ENTMCNC: 33.6 G/DL (ref 31.4–37.4)
MCV RBC AUTO: 90 FL (ref 82–98)
MONOCYTES # BLD AUTO: 0.71 THOUSAND/ΜL (ref 0.17–1.22)
MONOCYTES NFR BLD AUTO: 9 % (ref 4–12)
NEUTROPHILS # BLD AUTO: 4.53 THOUSANDS/ΜL (ref 1.85–7.62)
NEUTS SEG NFR BLD AUTO: 55 % (ref 43–75)
NONHDLC SERPL-MCNC: 48 MG/DL
PLATELET # BLD AUTO: 274 THOUSANDS/UL (ref 149–390)
PMV BLD AUTO: 9.4 FL (ref 8.9–12.7)
POTASSIUM SERPL-SCNC: 3.5 MMOL/L (ref 3.5–5.3)
PROT SERPL-MCNC: 6.8 G/DL (ref 6.4–8.2)
RBC # BLD AUTO: 4.01 MILLION/UL (ref 3.81–5.12)
SODIUM SERPL-SCNC: 141 MMOL/L (ref 136–145)
T3FREE SERPL-MCNC: 2.66 PG/ML (ref 2.3–4.2)
T4 FREE SERPL-MCNC: 1.02 NG/DL (ref 0.76–1.46)
TRIGL SERPL-MCNC: 32 MG/DL
TSH SERPL DL<=0.05 MIU/L-ACNC: 0.3 UIU/ML (ref 0.36–3.74)
VIT B12 SERPL-MCNC: 401 PG/ML (ref 100–900)
WBC # BLD AUTO: 8.3 THOUSAND/UL (ref 4.31–10.16)

## 2018-07-25 PROCEDURE — 84439 ASSAY OF FREE THYROXINE: CPT

## 2018-07-25 PROCEDURE — 36415 COLL VENOUS BLD VENIPUNCTURE: CPT

## 2018-07-25 PROCEDURE — 86480 TB TEST CELL IMMUN MEASURE: CPT

## 2018-07-25 PROCEDURE — 84443 ASSAY THYROID STIM HORMONE: CPT

## 2018-07-25 PROCEDURE — 83036 HEMOGLOBIN GLYCOSYLATED A1C: CPT

## 2018-07-25 PROCEDURE — 85652 RBC SED RATE AUTOMATED: CPT

## 2018-07-25 PROCEDURE — 80053 COMPREHEN METABOLIC PANEL: CPT

## 2018-07-25 PROCEDURE — 86038 ANTINUCLEAR ANTIBODIES: CPT

## 2018-07-25 PROCEDURE — 86235 NUCLEAR ANTIGEN ANTIBODY: CPT

## 2018-07-25 PROCEDURE — 86140 C-REACTIVE PROTEIN: CPT

## 2018-07-25 PROCEDURE — 86430 RHEUMATOID FACTOR TEST QUAL: CPT

## 2018-07-25 PROCEDURE — 84481 FREE ASSAY (FT-3): CPT

## 2018-07-25 PROCEDURE — 82306 VITAMIN D 25 HYDROXY: CPT

## 2018-07-25 PROCEDURE — 86800 THYROGLOBULIN ANTIBODY: CPT

## 2018-07-25 PROCEDURE — 86200 CCP ANTIBODY: CPT

## 2018-07-25 PROCEDURE — 84445 ASSAY OF TSI GLOBULIN: CPT

## 2018-07-25 PROCEDURE — 82607 VITAMIN B-12: CPT

## 2018-07-25 PROCEDURE — 82550 ASSAY OF CK (CPK): CPT

## 2018-07-25 PROCEDURE — 80061 LIPID PANEL: CPT

## 2018-07-25 PROCEDURE — 85025 COMPLETE CBC W/AUTO DIFF WBC: CPT

## 2018-07-25 PROCEDURE — 86146 BETA-2 GLYCOPROTEIN ANTIBODY: CPT

## 2018-07-25 PROCEDURE — 86376 MICROSOMAL ANTIBODY EACH: CPT

## 2018-07-26 LAB
CENTROMERE B AB SER-ACNC: <0.2 AI (ref 0–0.9)
ENA RNP AB SER-ACNC: 0.2 AI (ref 0–0.9)
ENA SCL70 AB SER-ACNC: <0.2 AI (ref 0–0.9)
ENA SM AB SER-ACNC: <0.2 AI (ref 0–0.9)
ENA SS-A AB SER-ACNC: <0.2 AI (ref 0–0.9)
ENA SS-B AB SER-ACNC: <0.2 AI (ref 0–0.9)
RHEUMATOID FACT SER QL LA: NEGATIVE
THYROGLOB AB SERPL-ACNC: <1 IU/ML (ref 0–0.9)
THYROPEROXIDASE AB SERPL-ACNC: 12 IU/ML (ref 0–34)

## 2018-07-27 LAB
ANNOTATION COMMENT IMP: NORMAL
B2 GLYCOPROT1 IGA SER-ACNC: <9 GPI IGA UNITS (ref 0–25)
B2 GLYCOPROT1 IGG SER-ACNC: <9 GPI IGG UNITS (ref 0–20)
B2 GLYCOPROT1 IGM SER-ACNC: <9 GPI IGM UNITS (ref 0–32)
CCP IGA+IGG SERPL IA-ACNC: 4 UNITS (ref 0–19)
GAMMA INTERFERON BACKGROUND BLD IA-ACNC: 0.03 IU/ML
M TB IFN-G BLD-IMP: NEGATIVE
M TB IFN-G CD4+ BCKGRND COR BLD-ACNC: 0.01 IU/ML
M TB IFN-G CD4+ T-CELLS BLD-ACNC: 0.04 IU/ML
MITOGEN IGNF BLD-ACNC: 7.4 IU/ML
QUANTIFERON-TB GOLD IN TUBE: NORMAL
RYE IGE QN: NEGATIVE
SERVICE CMNT-IMP: NORMAL
TSI SER-ACNC: <0.1 IU/L (ref 0–0.55)

## 2018-08-07 ENCOUNTER — APPOINTMENT (OUTPATIENT)
Dept: LAB | Facility: CLINIC | Age: 23
End: 2018-08-07
Payer: COMMERCIAL

## 2018-08-07 DIAGNOSIS — L65.9 LOSS OF HAIR: ICD-10-CM

## 2018-08-07 DIAGNOSIS — E16.2 HYPOGLYCEMIA, UNSPECIFIED: ICD-10-CM

## 2018-08-07 DIAGNOSIS — N92.6 IRREGULAR MENSTRUAL CYCLE: ICD-10-CM

## 2018-08-07 LAB
ALBUMIN SERPL BCP-MCNC: 4 G/DL (ref 3.5–5)
ALP SERPL-CCNC: 75 U/L (ref 46–116)
ALT SERPL W P-5'-P-CCNC: 27 U/L (ref 12–78)
ANION GAP SERPL CALCULATED.3IONS-SCNC: 6 MMOL/L (ref 4–13)
AST SERPL W P-5'-P-CCNC: 16 U/L (ref 5–45)
BETA-HYDROXYBUTYRATE: 0.12 MMOL/L (ref 0.02–0.27)
BILIRUB SERPL-MCNC: 1 MG/DL (ref 0.2–1)
BUN SERPL-MCNC: 13 MG/DL (ref 5–25)
CALCIUM SERPL-MCNC: 8.8 MG/DL (ref 8.3–10.1)
CHLORIDE SERPL-SCNC: 103 MMOL/L (ref 100–108)
CO2 SERPL-SCNC: 29 MMOL/L (ref 21–32)
CORTIS SERPL-MCNC: 14.4 UG/DL
CREAT SERPL-MCNC: 0.6 MG/DL (ref 0.6–1.3)
EST. AVERAGE GLUCOSE BLD GHB EST-MCNC: 97 MG/DL
FSH SERPL-ACNC: 5.8 MIU/ML
GFR SERPL CREATININE-BSD FRML MDRD: 129 ML/MIN/1.73SQ M
GLUCOSE P FAST SERPL-MCNC: 90 MG/DL (ref 65–99)
HBA1C MFR BLD: 5 % (ref 4.2–6.3)
INSULIN SERPL-ACNC: 3.1 MU/L (ref 3–25)
LH SERPL-ACNC: 5.2 MIU/ML
POTASSIUM SERPL-SCNC: 3.7 MMOL/L (ref 3.5–5.3)
PROLACTIN SERPL-MCNC: 27.1 NG/ML
PROT SERPL-MCNC: 7.4 G/DL (ref 6.4–8.2)
SODIUM SERPL-SCNC: 138 MMOL/L (ref 136–145)
T3FREE SERPL-MCNC: 3.05 PG/ML (ref 2.3–4.2)
T4 FREE SERPL-MCNC: 0.99 NG/DL (ref 0.76–1.46)

## 2018-08-07 PROCEDURE — 82010 KETONE BODYS QUAN: CPT

## 2018-08-07 PROCEDURE — 36415 COLL VENOUS BLD VENIPUNCTURE: CPT

## 2018-08-07 PROCEDURE — 82627 DEHYDROEPIANDROSTERONE: CPT

## 2018-08-07 PROCEDURE — 83002 ASSAY OF GONADOTROPIN (LH): CPT

## 2018-08-07 PROCEDURE — 83036 HEMOGLOBIN GLYCOSYLATED A1C: CPT

## 2018-08-07 PROCEDURE — 84481 FREE ASSAY (FT-3): CPT

## 2018-08-07 PROCEDURE — 84206 ASSAY OF PROINSULIN: CPT

## 2018-08-07 PROCEDURE — 80053 COMPREHEN METABOLIC PANEL: CPT

## 2018-08-07 PROCEDURE — 83001 ASSAY OF GONADOTROPIN (FSH): CPT

## 2018-08-07 PROCEDURE — 86337 INSULIN ANTIBODIES: CPT

## 2018-08-07 PROCEDURE — 84681 ASSAY OF C-PEPTIDE: CPT

## 2018-08-07 PROCEDURE — 82533 TOTAL CORTISOL: CPT

## 2018-08-07 PROCEDURE — 84270 ASSAY OF SEX HORMONE GLOBUL: CPT

## 2018-08-07 PROCEDURE — 83525 ASSAY OF INSULIN: CPT

## 2018-08-07 PROCEDURE — 84402 ASSAY OF FREE TESTOSTERONE: CPT

## 2018-08-07 PROCEDURE — 84403 ASSAY OF TOTAL TESTOSTERONE: CPT

## 2018-08-07 PROCEDURE — 84439 ASSAY OF FREE THYROXINE: CPT

## 2018-08-07 PROCEDURE — 83498 ASY HYDROXYPROGESTERONE 17-D: CPT

## 2018-08-07 PROCEDURE — 84146 ASSAY OF PROLACTIN: CPT

## 2018-08-07 PROCEDURE — 84445 ASSAY OF TSI GLOBULIN: CPT

## 2018-08-07 PROCEDURE — 82157 ASSAY OF ANDROSTENEDIONE: CPT

## 2018-08-08 LAB
C PEPTIDE SERPL-MCNC: 1.3 NG/ML (ref 1.1–4.4)
DHEA-S SERPL-MCNC: 413.9 UG/DL (ref 110–431.7)
SHBG SERPL-SCNC: 60 NMOL/L (ref 24.6–122)
TESTOST FREE SERPL-MCNC: 3.9 PG/ML (ref 0–4.2)
TESTOST SERPL-MCNC: 30 NG/DL (ref 8–48)
TSI SER-ACNC: <0.1 IU/L (ref 0–0.55)

## 2018-08-09 LAB — ANDROST SERPL-MCNC: 100 NG/DL (ref 41–262)

## 2018-08-10 LAB
17OHP SERPL-MCNC: 32 NG/DL
PROINSULIN SERPL-SCNC: 1.6 PMOL/L (ref 0–10)

## 2018-08-13 LAB — INSULIN AB SER-ACNC: <5 UU/ML

## 2019-05-23 ENCOUNTER — APPOINTMENT (OUTPATIENT)
Dept: LAB | Facility: CLINIC | Age: 24
End: 2019-05-23
Payer: COMMERCIAL

## 2019-05-23 ENCOUNTER — TRANSCRIBE ORDERS (OUTPATIENT)
Dept: LAB | Facility: CLINIC | Age: 24
End: 2019-05-23

## 2019-05-23 DIAGNOSIS — E55.9 AVITAMINOSIS D: ICD-10-CM

## 2019-05-23 DIAGNOSIS — Z86.59 PERSONAL HISTORY OF SCHIZOPHRENIA: ICD-10-CM

## 2019-05-23 DIAGNOSIS — E55.9 AVITAMINOSIS D: Primary | ICD-10-CM

## 2019-05-23 LAB
25(OH)D3 SERPL-MCNC: 10.3 NG/ML (ref 30–100)
ALBUMIN SERPL BCP-MCNC: 3.6 G/DL (ref 3.5–5)
ALP SERPL-CCNC: 82 U/L (ref 46–116)
ALT SERPL W P-5'-P-CCNC: 21 U/L (ref 12–78)
ANION GAP SERPL CALCULATED.3IONS-SCNC: 7 MMOL/L (ref 4–13)
AST SERPL W P-5'-P-CCNC: 13 U/L (ref 5–45)
BASOPHILS # BLD AUTO: 0.04 THOUSANDS/ΜL (ref 0–0.1)
BASOPHILS NFR BLD AUTO: 0 % (ref 0–1)
BILIRUB SERPL-MCNC: 0.3 MG/DL (ref 0.2–1)
BUN SERPL-MCNC: 10 MG/DL (ref 5–25)
CALCIUM SERPL-MCNC: 9.1 MG/DL (ref 8.3–10.1)
CHLORIDE SERPL-SCNC: 105 MMOL/L (ref 100–108)
CO2 SERPL-SCNC: 28 MMOL/L (ref 21–32)
CREAT SERPL-MCNC: 0.54 MG/DL (ref 0.6–1.3)
EOSINOPHIL # BLD AUTO: 0.33 THOUSAND/ΜL (ref 0–0.61)
EOSINOPHIL NFR BLD AUTO: 3 % (ref 0–6)
ERYTHROCYTE [DISTWIDTH] IN BLOOD BY AUTOMATED COUNT: 12 % (ref 11.6–15.1)
GFR SERPL CREATININE-BSD FRML MDRD: 133 ML/MIN/1.73SQ M
GLUCOSE SERPL-MCNC: 121 MG/DL (ref 65–140)
HCT VFR BLD AUTO: 38.8 % (ref 34.8–46.1)
HGB BLD-MCNC: 12.9 G/DL (ref 11.5–15.4)
IMM GRANULOCYTES # BLD AUTO: 0.04 THOUSAND/UL (ref 0–0.2)
IMM GRANULOCYTES NFR BLD AUTO: 0 % (ref 0–2)
LYMPHOCYTES # BLD AUTO: 2.5 THOUSANDS/ΜL (ref 0.6–4.47)
LYMPHOCYTES NFR BLD AUTO: 23 % (ref 14–44)
MCH RBC QN AUTO: 30.4 PG (ref 26.8–34.3)
MCHC RBC AUTO-ENTMCNC: 33.2 G/DL (ref 31.4–37.4)
MCV RBC AUTO: 92 FL (ref 82–98)
MONOCYTES # BLD AUTO: 0.7 THOUSAND/ΜL (ref 0.17–1.22)
MONOCYTES NFR BLD AUTO: 7 % (ref 4–12)
NEUTROPHILS # BLD AUTO: 7.19 THOUSANDS/ΜL (ref 1.85–7.62)
NEUTS SEG NFR BLD AUTO: 67 % (ref 43–75)
NRBC BLD AUTO-RTO: 0 /100 WBCS
PLATELET # BLD AUTO: 264 THOUSANDS/UL (ref 149–390)
PMV BLD AUTO: 9.2 FL (ref 8.9–12.7)
POTASSIUM SERPL-SCNC: 3.8 MMOL/L (ref 3.5–5.3)
PROT SERPL-MCNC: 6.8 G/DL (ref 6.4–8.2)
RBC # BLD AUTO: 4.24 MILLION/UL (ref 3.81–5.12)
SODIUM SERPL-SCNC: 140 MMOL/L (ref 136–145)
TSH SERPL DL<=0.05 MIU/L-ACNC: 0.59 UIU/ML (ref 0.36–3.74)
WBC # BLD AUTO: 10.8 THOUSAND/UL (ref 4.31–10.16)

## 2019-05-23 PROCEDURE — 84443 ASSAY THYROID STIM HORMONE: CPT

## 2019-05-23 PROCEDURE — 85025 COMPLETE CBC W/AUTO DIFF WBC: CPT

## 2019-05-23 PROCEDURE — 80053 COMPREHEN METABOLIC PANEL: CPT

## 2019-05-23 PROCEDURE — 36415 COLL VENOUS BLD VENIPUNCTURE: CPT

## 2019-05-23 PROCEDURE — 82306 VITAMIN D 25 HYDROXY: CPT

## 2020-08-18 NOTE — TELEPHONE ENCOUNTER
Discussed with patient at visit today. 8/18/2020 Letter typed and given to provider to sign  Please call patient and notify of completion

## 2020-11-16 ENCOUNTER — HOSPITAL ENCOUNTER (EMERGENCY)
Facility: HOSPITAL | Age: 25
Discharge: HOME | End: 2020-11-16
Attending: EMERGENCY MEDICINE
Payer: COMMERCIAL

## 2020-11-16 VITALS
HEIGHT: 60 IN | BODY MASS INDEX: 24.63 KG/M2 | SYSTOLIC BLOOD PRESSURE: 119 MMHG | RESPIRATION RATE: 18 BRPM | HEART RATE: 76 BPM | WEIGHT: 125.44 LBS | OXYGEN SATURATION: 97 % | TEMPERATURE: 98.1 F | DIASTOLIC BLOOD PRESSURE: 70 MMHG

## 2020-11-16 DIAGNOSIS — J02.9 PHARYNGITIS: Primary | ICD-10-CM

## 2020-11-16 PROCEDURE — 99284 EMERGENCY DEPT VISIT MOD MDM: CPT

## 2020-11-16 PROCEDURE — 96372 THER/PROPH/DIAG INJ SC/IM: CPT

## 2020-11-16 PROCEDURE — 99284 EMERGENCY DEPT VISIT MOD MDM: CPT | Performed by: EMERGENCY MEDICINE

## 2020-11-16 RX ORDER — DEXAMETHASONE SODIUM PHOSPHATE 4 MG/ML
8 INJECTION, SOLUTION INTRA-ARTICULAR; INTRALESIONAL; INTRAMUSCULAR; INTRAVENOUS; SOFT TISSUE ONCE
Status: COMPLETED | OUTPATIENT
Start: 2020-11-16 | End: 2020-11-16

## 2020-11-16 RX ADMIN — DEXAMETHASONE SODIUM PHOSPHATE 8 MG: 4 INJECTION, SOLUTION INTRAMUSCULAR; INTRAVENOUS at 22:17

## 2020-11-16 RX ADMIN — PENICILLIN G BENZATHINE 1.2 MILLION UNITS: 1200000 INJECTION, SUSPENSION INTRAMUSCULAR at 22:18

## 2021-05-19 ENCOUNTER — OFFICE VISIT (OUTPATIENT)
Dept: OBGYN CLINIC | Facility: CLINIC | Age: 26
End: 2021-05-19

## 2021-05-19 VITALS
DIASTOLIC BLOOD PRESSURE: 74 MMHG | WEIGHT: 144 LBS | HEIGHT: 60 IN | BODY MASS INDEX: 28.27 KG/M2 | SYSTOLIC BLOOD PRESSURE: 120 MMHG

## 2021-05-19 DIAGNOSIS — N92.0 MENORRHAGIA WITH REGULAR CYCLE: Primary | ICD-10-CM

## 2021-05-19 PROCEDURE — 99203 OFFICE O/P NEW LOW 30 MIN: CPT | Performed by: OBSTETRICS & GYNECOLOGY

## 2021-05-19 RX ORDER — ERGOCALCIFEROL 1.25 MG/1
CAPSULE ORAL
COMMUNITY
Start: 2021-03-16

## 2021-05-19 RX ORDER — ACETAMINOPHEN AND CODEINE PHOSPHATE 120; 12 MG/5ML; MG/5ML
1 SOLUTION ORAL DAILY
Qty: 28 TABLET | Refills: 3 | Status: SHIPPED | OUTPATIENT
Start: 2021-05-19 | End: 2021-07-30

## 2021-05-20 NOTE — PROGRESS NOTES
Assessment/Plan:     Diagnoses and all orders for this visit:    Menorrhagia with regular cycle  -     US pelvis complete w transvaginal; Future  -     TSH + Free T4; Future  -     norethindrone (MICRONOR) 0 35 MG tablet; Take 1 tablet (0 35 mg total) by mouth daily    Other orders  -     ergocalciferol (VITAMIN D2) 50,000 units; TAKE 1 CAPSULE BY MOUTH ONE TIME PER WEEK        31 yo female  Menorrhagia  Had TIA  PTSD  Was on OCP stop long time ago   Plan   Pelvic U/S  desires P only pills  rto in 3m f/w and annual exam     Subjective:      Patient ID: Paulo Olivares is a 32 y o  female  HPI  31 yo female here today sec to menorrhagia  Was on OCP stop ten since that time period was irreg till last 6m started to become reg   But very heavy bleeding  And prolonged for the last 2 months was having bleeding for 7 days consistently passing clots different management option for her menorrhagia explained and discussed with patient in details  Tranexamic acid, Mirena IUD, progesterone only pills all explained and discussed with patient patient interested in trying progesterone only pills risk benefit side effect explained and discussed with patient in details important of using condom with sexual activity discussed all patient questions answered and patient was satisfied    The following portions of the patient's history were reviewed and updated as appropriate: allergies, current medications, past family history, past medical history, past social history, past surgical history and problem list     Review of Systems   Constitutional: Negative for activity change, appetite change, chills, fatigue and fever  Respiratory: Negative for cough and shortness of breath  Cardiovascular: Negative for chest pain, palpitations and leg swelling  Gastrointestinal: Negative for abdominal pain, constipation, diarrhea, nausea and vomiting     Genitourinary: Negative for difficulty urinating, dysuria, flank pain, frequency, hematuria, urgency and vaginal discharge  Neurological: Negative for dizziness and headaches  Psychiatric/Behavioral: Negative for confusion  Objective:      /74 (BP Location: Left arm, Patient Position: Sitting, Cuff Size: Standard)   Ht 5' (1 524 m)   Wt 65 3 kg (144 lb)   LMP 04/24/2021   BMI 28 12 kg/m²          Physical Exam  Constitutional:       Appearance: Normal appearance  Neurological:      Mental Status: She is alert and oriented to person, place, and time           [id number]

## 2021-06-26 ENCOUNTER — HOSPITAL ENCOUNTER (EMERGENCY)
Facility: HOSPITAL | Age: 26
Discharge: HOME | End: 2021-06-26

## 2021-06-26 ENCOUNTER — APPOINTMENT (EMERGENCY)
Dept: RADIOLOGY | Facility: HOSPITAL | Age: 26
End: 2021-06-26

## 2021-06-26 VITALS
HEART RATE: 87 BPM | OXYGEN SATURATION: 100 % | HEIGHT: 61 IN | BODY MASS INDEX: 26.04 KG/M2 | SYSTOLIC BLOOD PRESSURE: 134 MMHG | TEMPERATURE: 98.6 F | WEIGHT: 137.9 LBS | DIASTOLIC BLOOD PRESSURE: 84 MMHG | RESPIRATION RATE: 16 BRPM

## 2021-06-26 DIAGNOSIS — R07.89 ATYPICAL CHEST PAIN: Primary | ICD-10-CM

## 2021-06-26 LAB
ALBUMIN SERPL BCP-MCNC: 4.3 G/DL (ref 3.4–4.8)
ALP SERPL-CCNC: 70.1 U/L (ref 35–140)
ALT SERPL W P-5'-P-CCNC: 11 U/L (ref 5–54)
ANION GAP SERPL CALCULATED.3IONS-SCNC: 8 MMOL/L (ref 4–13)
APTT PPP: 28 SECONDS (ref 23–31)
AST SERPL W P-5'-P-CCNC: 12 U/L (ref 15–41)
BASOPHILS # BLD AUTO: 0.03 THOUSANDS/ΜL (ref 0–0.1)
BASOPHILS NFR BLD AUTO: 0 % (ref 0–1)
BILIRUB SERPL-MCNC: 1.14 MG/DL (ref 0.3–1.2)
BUN SERPL-MCNC: 11 MG/DL (ref 6–20)
CALCIUM SERPL-MCNC: 9.1 MG/DL (ref 8.4–10.2)
CHLORIDE SERPL-SCNC: 102 MMOL/L (ref 96–108)
CO2 SERPL-SCNC: 29 MMOL/L (ref 22–33)
CREAT SERPL-MCNC: 0.64 MG/DL (ref 0.4–1.1)
D DIMER PPP FEU-MCNC: 0.19 MG/L FEU (ref 0.19–0.49)
EOSINOPHIL # BLD AUTO: 0.17 THOUSAND/ΜL (ref 0–0.61)
EOSINOPHIL NFR BLD AUTO: 2 % (ref 0–6)
ERYTHROCYTE [DISTWIDTH] IN BLOOD BY AUTOMATED COUNT: 11.7 % (ref 11.6–15.1)
GFR SERPL CREATININE-BSD FRML MDRD: 124 ML/MIN/1.73SQ M
GLUCOSE SERPL-MCNC: 129 MG/DL (ref 65–140)
HCT VFR BLD AUTO: 38.7 % (ref 34.8–46.1)
HGB BLD-MCNC: 12.9 G/DL (ref 11.5–15.4)
IMM GRANULOCYTES # BLD AUTO: 0.02 THOUSAND/UL (ref 0–0.2)
IMM GRANULOCYTES NFR BLD AUTO: 0 % (ref 0–2)
INR PPP: 0.95 (ref 0.9–1.1)
LYMPHOCYTES # BLD AUTO: 2.96 THOUSANDS/ΜL (ref 0.6–4.47)
LYMPHOCYTES NFR BLD AUTO: 33 % (ref 14–44)
MCH RBC QN AUTO: 29.2 PG (ref 26.8–34.3)
MCHC RBC AUTO-ENTMCNC: 33.3 G/DL (ref 31.4–37.4)
MCV RBC AUTO: 88 FL (ref 82–98)
MONOCYTES # BLD AUTO: 0.69 THOUSAND/ΜL (ref 0.17–1.22)
MONOCYTES NFR BLD AUTO: 8 % (ref 4–12)
NEUTROPHILS # BLD AUTO: 5.22 THOUSANDS/ΜL (ref 1.85–7.62)
NEUTS SEG NFR BLD AUTO: 57 % (ref 43–75)
PLATELET # BLD AUTO: 370 THOUSANDS/UL (ref 149–390)
PMV BLD AUTO: 9.1 FL (ref 8.9–12.7)
POTASSIUM SERPL-SCNC: 3.4 MMOL/L (ref 3.5–5)
PROT SERPL-MCNC: 7 G/DL (ref 6.4–8.3)
PROTHROMBIN TIME: 10.8 SECONDS (ref 9.5–12.1)
RBC # BLD AUTO: 4.42 MILLION/UL (ref 3.81–5.12)
SODIUM SERPL-SCNC: 139 MMOL/L (ref 133–145)
TROPONIN I SERPL-MCNC: <0.03 NG/ML (ref 0–0.07)
TSH SERPL DL<=0.05 MIU/L-ACNC: 0.36 UIU/ML (ref 0.34–5.6)
WBC # BLD AUTO: 9.09 THOUSAND/UL (ref 4.31–10.16)

## 2021-06-26 PROCEDURE — 99285 EMERGENCY DEPT VISIT HI MDM: CPT

## 2021-06-26 PROCEDURE — 36415 COLL VENOUS BLD VENIPUNCTURE: CPT

## 2021-06-26 PROCEDURE — 85610 PROTHROMBIN TIME: CPT

## 2021-06-26 PROCEDURE — 85730 THROMBOPLASTIN TIME PARTIAL: CPT

## 2021-06-26 PROCEDURE — 80053 COMPREHEN METABOLIC PANEL: CPT

## 2021-06-26 PROCEDURE — 93005 ELECTROCARDIOGRAM TRACING: CPT

## 2021-06-26 PROCEDURE — 85025 COMPLETE CBC W/AUTO DIFF WBC: CPT

## 2021-06-26 PROCEDURE — 84484 ASSAY OF TROPONIN QUANT: CPT

## 2021-06-26 PROCEDURE — 85379 FIBRIN DEGRADATION QUANT: CPT

## 2021-06-26 PROCEDURE — 71045 X-RAY EXAM CHEST 1 VIEW: CPT

## 2021-06-26 PROCEDURE — 84443 ASSAY THYROID STIM HORMONE: CPT

## 2021-06-26 NOTE — ED PROVIDER NOTES
History  Chief Complaint   Patient presents with    Chest Pain     Pt having intermittent pressure/ tight CP for a few months, stated it has been constant for the last few days and lost feeling in her legs and was unable to walk  she has lost 15 lb in 8 days  Had neck ultrasound last week for thyroid problem but did not get results  60-year-old female history thyroid nodules here secondary to having intermittent episodes of tightness in her chest for the past day  She currently is not having active chest pain on my evaluation  She denies any shortness of breath associated with she denies any palpitations or near syncopal events  Patient does states sometimes the pain seems to radiate to her neck  Patient is also complaining of intermittent symptoms are neck for her thyroid nodule  Patient denies any leg pain or swelling denies any URI symptoms no cough no cold no congestion  Patient states symptoms are moderate to severe when she has the chest pain usually last upwards of 10-15 minutes at times  Prior to Admission Medications   Prescriptions Last Dose Informant Patient Reported? Taking?   ergocalciferol (VITAMIN D2) 50,000 units 6/26/2021 at Unknown time  Yes Yes   Sig: TAKE 1 CAPSULE BY MOUTH ONE TIME PER WEEK   norethindrone (MICRONOR) 0 35 MG tablet   No No   Sig: Take 1 tablet (0 35 mg total) by mouth daily      Facility-Administered Medications: None       Past Medical History:   Diagnosis Date    Scoliosis        History reviewed  No pertinent surgical history  Family History   Problem Relation Age of Onset    Diabetes Father     Asthma Mother      I have reviewed and agree with the history as documented      E-Cigarette/Vaping    E-Cigarette Use Never User      E-Cigarette/Vaping Substances    Nicotine No     Flavoring No      Social History     Tobacco Use    Smoking status: Never Smoker    Smokeless tobacco: Never Used   Vaping Use    Vaping Use: Never used   Substance Use Topics    Alcohol use: No    Drug use: Yes     Types: Marijuana     Comment: medical marijuana, smoke every few days       Review of Systems   Constitutional: Negative for chills and fever  HENT: Negative for congestion  Eyes: Negative for visual disturbance  Respiratory: Negative for shortness of breath  Cardiovascular: Positive for chest pain  Gastrointestinal: Negative for abdominal pain  Endocrine: Negative for cold intolerance  Genitourinary: Negative for frequency  Musculoskeletal: Negative for gait problem  Skin: Negative for rash  Neurological: Negative for dizziness  Psychiatric/Behavioral: Negative for behavioral problems and confusion  Physical Exam  Physical Exam  Vitals and nursing note reviewed  Constitutional:       Appearance: She is well-developed  HENT:      Head: Normocephalic and atraumatic  Eyes:      Conjunctiva/sclera: Conjunctivae normal       Pupils: Pupils are equal, round, and reactive to light  Cardiovascular:      Rate and Rhythm: Normal rate and regular rhythm  Heart sounds: Normal heart sounds  Pulmonary:      Effort: Pulmonary effort is normal       Breath sounds: Normal breath sounds  Abdominal:      General: Bowel sounds are normal       Palpations: Abdomen is soft  Musculoskeletal:         General: Normal range of motion  Cervical back: Normal range of motion and neck supple  Skin:     General: Skin is warm and dry  Capillary Refill: Capillary refill takes less than 2 seconds  Neurological:      Mental Status: She is alert and oriented to person, place, and time  Psychiatric:         Behavior: Behavior normal          Vital Signs  ED Triage Vitals   Temp Pulse Resp BP SpO2   -- -- -- -- --      Temp src Heart Rate Source Patient Position - Orthostatic VS BP Location FiO2 (%)   -- -- -- -- --      Pain Score       --           There were no vitals filed for this visit        Visual Acuity      ED Medications  Medications - No data to display    Diagnostic Studies  Results Reviewed     Procedure Component Value Units Date/Time    TSH [[id number]]     Lab Status: No result Specimen: Blood     CBC and differential [[id number]]     Lab Status: No result Specimen: Blood     Comprehensive metabolic panel [[id number]]     Lab Status: No result Specimen: Blood     Troponin I [[id number]]     Lab Status: No result Specimen: Blood     D-dimer, quantitative [[id number]]     Lab Status: No result Specimen: Blood     Protime-INR [[id number]]     Lab Status: No result Specimen: Blood     APTT [[id number]]     Lab Status: No result Specimen: Blood                  XR chest 1 view portable    (Results Pending)              Procedures  ECG 12 Lead Documentation Only    Date/Time: 6/26/2021 4:58 PM  Performed by: Whitley Yarbrough MD  Authorized by: Whitley Yarbrough MD     Indications / Diagnosis:  Chest pain  Comments:      EKG demonstrates normal sinus rhythm rate of 78 no acute ST T wave abnormalities normal interval normal axis             ED Course                                           MDM    Disposition  Final diagnoses:   None     ED Disposition     None      Follow-up Information    None         Patient's Medications   Discharge Prescriptions    No medications on file     No discharge procedures on file      PDMP Review       Value Time User    PDMP Reviewed  Yes 11/16/2020 10:02 PM Irish Esopsito MD          ED Provider  Electronically Signed by           Whitley Yarbrough MD  06/26/21 9233

## 2021-06-28 ENCOUNTER — HOSPITAL ENCOUNTER (OUTPATIENT)
Dept: ULTRASOUND IMAGING | Facility: HOSPITAL | Age: 26
Discharge: HOME | End: 2021-06-28
Attending: OBSTETRICS & GYNECOLOGY

## 2021-06-28 DIAGNOSIS — N92.0 MENORRHAGIA WITH REGULAR CYCLE: ICD-10-CM

## 2021-06-28 LAB
ATRIAL RATE: 78 BPM
P AXIS: 35 DEGREES
PR INTERVAL: 112 MS
QRS AXIS: 61 DEGREES
QRSD INTERVAL: 92 MS
QT INTERVAL: 390 MS
QTC INTERVAL: 442 MS
T WAVE AXIS: 45 DEGREES
VENTRICULAR RATE: 77 BPM

## 2021-06-28 PROCEDURE — 76856 US EXAM PELVIC COMPLETE: CPT

## 2021-06-28 PROCEDURE — 93010 ELECTROCARDIOGRAM REPORT: CPT | Performed by: INTERNAL MEDICINE

## 2021-06-28 PROCEDURE — 76830 TRANSVAGINAL US NON-OB: CPT

## 2021-07-30 ENCOUNTER — HOSPITAL ENCOUNTER (EMERGENCY)
Facility: HOSPITAL | Age: 26
Discharge: HOME | End: 2021-07-30
Attending: EMERGENCY MEDICINE | Admitting: EMERGENCY MEDICINE

## 2021-07-30 VITALS
TEMPERATURE: 98.3 F | HEART RATE: 68 BPM | WEIGHT: 143.3 LBS | DIASTOLIC BLOOD PRESSURE: 70 MMHG | RESPIRATION RATE: 16 BRPM | SYSTOLIC BLOOD PRESSURE: 108 MMHG | BODY MASS INDEX: 27.08 KG/M2 | OXYGEN SATURATION: 100 %

## 2021-07-30 DIAGNOSIS — K62.5 RECTAL BLEEDING: Primary | ICD-10-CM

## 2021-07-30 LAB
ALBUMIN SERPL BCP-MCNC: 4.4 G/DL (ref 3.4–4.8)
ALP SERPL-CCNC: 64.9 U/L (ref 35–140)
ALT SERPL W P-5'-P-CCNC: 11 U/L (ref 5–54)
ANION GAP SERPL CALCULATED.3IONS-SCNC: 8 MMOL/L (ref 4–13)
AST SERPL W P-5'-P-CCNC: 16 U/L (ref 15–41)
BACTERIA UR QL AUTO: ABNORMAL /HPF
BASOPHILS # BLD AUTO: 0.02 THOUSANDS/ΜL (ref 0–0.1)
BASOPHILS NFR BLD AUTO: 0 % (ref 0–1)
BILIRUB SERPL-MCNC: 0.75 MG/DL (ref 0.3–1.2)
BILIRUB UR QL STRIP: NEGATIVE
BUN SERPL-MCNC: 9 MG/DL (ref 6–20)
CALCIUM SERPL-MCNC: 9.1 MG/DL (ref 8.4–10.2)
CHLORIDE SERPL-SCNC: 102 MMOL/L (ref 96–108)
CLARITY UR: ABNORMAL
CO2 SERPL-SCNC: 29 MMOL/L (ref 22–33)
COLOR UR: ABNORMAL
CREAT SERPL-MCNC: 0.57 MG/DL (ref 0.4–1.1)
EOSINOPHIL # BLD AUTO: 0.22 THOUSAND/ΜL (ref 0–0.61)
EOSINOPHIL NFR BLD AUTO: 2 % (ref 0–6)
ERYTHROCYTE [DISTWIDTH] IN BLOOD BY AUTOMATED COUNT: 11.7 % (ref 11.6–15.1)
EXT PREG TEST URINE: NEGATIVE
EXT. CONTROL ED NAV: NORMAL
GFR SERPL CREATININE-BSD FRML MDRD: 128 ML/MIN/1.73SQ M
GLUCOSE SERPL-MCNC: 99 MG/DL (ref 65–140)
GLUCOSE UR STRIP-MCNC: NEGATIVE MG/DL
HCT VFR BLD AUTO: 37.1 % (ref 34.8–46.1)
HGB BLD-MCNC: 12.3 G/DL (ref 11.5–15.4)
HGB UR QL STRIP.AUTO: ABNORMAL
IMM GRANULOCYTES # BLD AUTO: 0.02 THOUSAND/UL (ref 0–0.2)
IMM GRANULOCYTES NFR BLD AUTO: 0 % (ref 0–2)
KETONES UR STRIP-MCNC: NEGATIVE MG/DL
LEUKOCYTE ESTERASE UR QL STRIP: NEGATIVE
LIPASE SERPL-CCNC: 11 U/L (ref 13–60)
LYMPHOCYTES # BLD AUTO: 2.62 THOUSANDS/ΜL (ref 0.6–4.47)
LYMPHOCYTES NFR BLD AUTO: 27 % (ref 14–44)
MCH RBC QN AUTO: 29.1 PG (ref 26.8–34.3)
MCHC RBC AUTO-ENTMCNC: 33.2 G/DL (ref 31.4–37.4)
MCV RBC AUTO: 88 FL (ref 82–98)
MONOCYTES # BLD AUTO: 0.74 THOUSAND/ΜL (ref 0.17–1.22)
MONOCYTES NFR BLD AUTO: 8 % (ref 4–12)
NEUTROPHILS # BLD AUTO: 6.02 THOUSANDS/ΜL (ref 1.85–7.62)
NEUTS SEG NFR BLD AUTO: 63 % (ref 43–75)
NITRITE UR QL STRIP: NEGATIVE
NON-SQ EPI CELLS URNS QL MICRO: ABNORMAL /HPF
PH UR STRIP.AUTO: 6.5 [PH]
PLATELET # BLD AUTO: 351 THOUSANDS/UL (ref 149–390)
PMV BLD AUTO: 9.4 FL (ref 8.9–12.7)
POTASSIUM SERPL-SCNC: 3.7 MMOL/L (ref 3.5–5)
PROT SERPL-MCNC: 7.2 G/DL (ref 6.4–8.3)
PROT UR STRIP-MCNC: ABNORMAL MG/DL
RBC # BLD AUTO: 4.22 MILLION/UL (ref 3.81–5.12)
RBC #/AREA URNS AUTO: ABNORMAL /HPF
SODIUM SERPL-SCNC: 139 MMOL/L (ref 133–145)
SP GR UR STRIP.AUTO: 1.02 (ref 1–1.03)
UROBILINOGEN UR QL STRIP.AUTO: 1 E.U./DL
WBC # BLD AUTO: 9.64 THOUSAND/UL (ref 4.31–10.16)
WBC #/AREA URNS AUTO: ABNORMAL /HPF

## 2021-07-30 PROCEDURE — 85025 COMPLETE CBC W/AUTO DIFF WBC: CPT | Performed by: STUDENT IN AN ORGANIZED HEALTH CARE EDUCATION/TRAINING PROGRAM

## 2021-07-30 PROCEDURE — 81025 URINE PREGNANCY TEST: CPT | Performed by: STUDENT IN AN ORGANIZED HEALTH CARE EDUCATION/TRAINING PROGRAM

## 2021-07-30 PROCEDURE — 99285 EMERGENCY DEPT VISIT HI MDM: CPT

## 2021-07-30 PROCEDURE — 36415 COLL VENOUS BLD VENIPUNCTURE: CPT | Performed by: STUDENT IN AN ORGANIZED HEALTH CARE EDUCATION/TRAINING PROGRAM

## 2021-07-30 PROCEDURE — 81001 URINALYSIS AUTO W/SCOPE: CPT | Performed by: STUDENT IN AN ORGANIZED HEALTH CARE EDUCATION/TRAINING PROGRAM

## 2021-07-30 PROCEDURE — 80053 COMPREHEN METABOLIC PANEL: CPT | Performed by: STUDENT IN AN ORGANIZED HEALTH CARE EDUCATION/TRAINING PROGRAM

## 2021-07-30 PROCEDURE — 83690 ASSAY OF LIPASE: CPT | Performed by: STUDENT IN AN ORGANIZED HEALTH CARE EDUCATION/TRAINING PROGRAM

## 2021-07-30 PROCEDURE — 99284 EMERGENCY DEPT VISIT MOD MDM: CPT | Performed by: EMERGENCY MEDICINE

## 2021-07-30 PROCEDURE — 96360 HYDRATION IV INFUSION INIT: CPT

## 2021-07-30 PROCEDURE — 81003 URINALYSIS AUTO W/O SCOPE: CPT | Performed by: STUDENT IN AN ORGANIZED HEALTH CARE EDUCATION/TRAINING PROGRAM

## 2021-07-30 RX ADMIN — SODIUM CHLORIDE 1000 ML: 0.9 INJECTION, SOLUTION INTRAVENOUS at 21:10

## 2021-07-31 NOTE — ED ATTENDING ATTESTATION
7/30/2021  Jamie Schwab DO, saw and evaluated the patient  I have discussed the patient with the resident/non-physician practitioner and agree with the resident's/non-physician practitioner's findings, Plan of Care, and MDM as documented in the resident's/non-physician practitioner's note, except where noted  All available labs and Radiology studies were reviewed  I was present for key portions of any procedure(s) performed by the resident/non-physician practitioner and I was immediately available to provide assistance  At this point I agree with the current assessment done in the Emergency Department  I have conducted an independent evaluation of this patient a history and physical is as follows:  57-year-old female who presented to the emergency department with rectal bleeding  The patient was well-appearing on exam   She is not tachycardic or febrile  She only had minimal abdominal tenderness on her lower abdomen  She had a stable hemoglobin was discharged follow-up to GI    ED Course         Critical Care Time  Procedures

## 2021-07-31 NOTE — ED PROVIDER NOTES
History  Chief Complaint   Patient presents with    Rectal Bleeding     pt states she is having rectal bleeding, states this has been happening for 2 months, pt states today there is more blood, h/o hemmrhoids ( bright red blood)      Patient 69-year-old female with past medical history of constipation and hemorrhoids who presents ED today with complaint of rectal bleeding times 2 months  Patient states that she has noticed bleeding after wiping described as bright red blood for the past 2 months that has been on and off but has been worsening today stating she had 3 episodes of bowel movements each time with bright red blood in the toilet bowl  She admits to associated rectal pain described as feelings eye knife is stabbing her every time she has a bowel movement  Patient also admits to constipation that she states has been going on for the past 10 years as well as lower abdominal pain rated 8/10 located in the lower abdomen/suprapubic area, and described as a constant aching pain  Patient mentioned that she is currently on her period and states that they are normally very mild but is unsure whether or not bleeding is worsening due to current vaginal bleeding or whether all the bleeding is rectal   Patient denies fever/chills, is blood within the stool/dark colored stools, diarrhea, nausea/vomiting, dysuria, increased frequency/urgency, fatigue, weakness, SOB or chest pain  History provided by:  Patient   used: No        Prior to Admission Medications   Prescriptions Last Dose Informant Patient Reported? Taking?   ergocalciferol (VITAMIN D2) 50,000 units   Yes No   Sig: TAKE 1 CAPSULE BY MOUTH ONE TIME PER WEEK      Facility-Administered Medications: None       Past Medical History:   Diagnosis Date    Scoliosis        History reviewed  No pertinent surgical history      Family History   Problem Relation Age of Onset    Diabetes Father     Asthma Mother      I have reviewed and agree with the history as documented  E-Cigarette/Vaping    E-Cigarette Use Never User      E-Cigarette/Vaping Substances    Nicotine No     Flavoring No      Social History     Tobacco Use    Smoking status: Never Smoker    Smokeless tobacco: Never Used   Vaping Use    Vaping Use: Never used   Substance Use Topics    Alcohol use: No    Drug use: Yes     Types: Marijuana     Comment: medical marijuana, smoke every few days       Review of Systems   Constitutional: Negative for chills and fever  HENT: Negative  Eyes: Negative  Respiratory: Negative for chest tightness and shortness of breath  Cardiovascular: Negative for chest pain  Gastrointestinal: Positive for abdominal pain, anal bleeding, constipation and rectal pain  Negative for blood in stool, diarrhea, nausea and vomiting  Genitourinary: Negative for dysuria, flank pain, frequency, pelvic pain, urgency, vaginal bleeding and vaginal pain  Musculoskeletal: Negative for arthralgias, back pain, neck pain and neck stiffness  Skin: Negative  Neurological: Negative for dizziness, seizures, syncope, weakness, light-headedness, numbness and headaches  Psychiatric/Behavioral: Negative  All other systems reviewed and are negative  Physical Exam  Physical Exam  Vitals and nursing note reviewed  Exam conducted with a chaperone present (Carlos Manuel Tiwari)  Constitutional:       General: She is not in acute distress  Appearance: Normal appearance  She is normal weight  She is not ill-appearing  HENT:      Head: Normocephalic  Right Ear: External ear normal       Left Ear: External ear normal       Nose: Nose normal       Mouth/Throat:      Mouth: Mucous membranes are moist       Pharynx: Oropharynx is clear  Eyes:      Extraocular Movements: Extraocular movements intact  Conjunctiva/sclera: Conjunctivae normal       Pupils: Pupils are equal, round, and reactive to light     Cardiovascular:      Rate and Rhythm: Normal rate and regular rhythm  Heart sounds: Normal heart sounds  No murmur heard  Pulmonary:      Effort: Pulmonary effort is normal  No respiratory distress  Breath sounds: Normal breath sounds and air entry  No decreased breath sounds or wheezing  Chest:      Chest wall: No tenderness  Abdominal:      General: Abdomen is flat  Bowel sounds are normal  There is no distension  Palpations: Abdomen is soft  Tenderness: There is abdominal tenderness in the right lower quadrant, suprapubic area and left lower quadrant  There is no guarding or rebound  Negative signs include Gomez's sign, Rovsing's sign and psoas sign  Genitourinary:     Rectum: Normal  No anal fissure, external hemorrhoid or internal hemorrhoid  Comments: Rectal examination showed no signs of blood with no blood present on JAXSON  No signs of external hemorrhoids or anal fissure  No palpable internal hemorrhoids  Rectal tone intact  Musculoskeletal:      Cervical back: Normal range of motion  Skin:     General: Skin is warm and dry  Capillary Refill: Capillary refill takes less than 2 seconds  Coloration: Skin is not pale  Neurological:      General: No focal deficit present  Mental Status: She is alert and oriented to person, place, and time  Sensory: Sensation is intact  No sensory deficit  Motor: Motor function is intact  No weakness  Psychiatric:         Attention and Perception: Attention normal          Mood and Affect: Mood normal          Speech: Speech normal          Behavior: Behavior normal          Thought Content:  Thought content normal          Judgment: Judgment normal          Vital Signs  ED Triage Vitals   Temperature Pulse Respirations Blood Pressure SpO2   07/30/21 2018 07/30/21 2018 07/30/21 2018 07/30/21 2018 07/30/21 2018   98 3 °F (36 8 °C) 82 16 133/73 100 %      Temp Source Heart Rate Source Patient Position - Orthostatic VS BP Location FiO2 (%)   07/30/21 2018 07/30/21 2018 07/30/21 2018 07/30/21 2018 --   Oral Monitor Lying Left arm       Pain Score       07/30/21 2233       No Pain           Vitals:    07/30/21 2018 07/30/21 2233   BP: 133/73 108/70   Pulse: 82 68   Patient Position - Orthostatic VS: Lying Sitting         Visual Acuity      ED Medications  Medications   sodium chloride 0 9 % bolus 1,000 mL (0 mL Intravenous Stopped 7/30/21 2230)       Diagnostic Studies  Results Reviewed     Procedure Component Value Units Date/Time    Urine Microscopic [[id number]]  (Abnormal) Collected: 07/30/21 2107    Lab Status: Final result Specimen: Urine, Clean Catch Updated: 07/30/21 2151     RBC, UA 4-10 /hpf      WBC, UA None Seen /hpf      Epithelial Cells Occasional /hpf      Bacteria, UA Occasional /hpf     Comprehensive metabolic panel [[id number]] Collected: 07/30/21 2056    Lab Status: Final result Specimen: Blood from Arm, Left Updated: 07/30/21 2121     Sodium 139 mmol/L      Potassium 3 7 mmol/L      Chloride 102 mmol/L      CO2 29 mmol/L      ANION GAP 8 mmol/L      BUN 9 mg/dL      Creatinine 0 57 mg/dL      Glucose 99 mg/dL      Calcium 9 1 mg/dL      AST 16 U/L      ALT 11 U/L      Alkaline Phosphatase 64 9 U/L      Total Protein 7 2 g/dL      Albumin 4 4 g/dL      Total Bilirubin 0 75 mg/dL      eGFR 128 ml/min/1 73sq m     Narrative:      Tracy guidelines for Chronic Kidney Disease (CKD):     Stage 1 with normal or high GFR (GFR > 90 mL/min/1 73 square meters)    Stage 2 Mild CKD (GFR = 60-89 mL/min/1 73 square meters)    Stage 3A Moderate CKD (GFR = 45-59 mL/min/1 73 square meters)    Stage 3B Moderate CKD (GFR = 30-44 mL/min/1 73 square meters)    Stage 4 Severe CKD (GFR = 15-29 mL/min/1 73 square meters)    Stage 5 End Stage CKD (GFR <15 mL/min/1 73 square meters)  Note: GFR calculation is accurate only with a steady state creatinine    Lipase [[id number]]  (Abnormal) Collected: 07/30/21 2056    Lab Status: Final result Specimen: Blood from Arm, Left Updated: 07/30/21 2121     Lipase 11 u/L     UA w Reflex to Microscopic w Reflex to Culture [[id number]]  (Abnormal) Collected: 07/30/21 2107    Lab Status: Final result Specimen: Urine, Clean Catch Updated: 07/30/21 2116     Color, UA Red     Clarity, UA Slightly Cloudy     Specific Creede, UA 1 020     pH, UA 6 5     Leukocytes, UA Negative     Nitrite, UA Negative     Protein, UA 2+ mg/dl      Glucose, UA Negative mg/dl      Ketones, UA Negative mg/dl      Urobilinogen, UA 1 0 E U /dl      Bilirubin, UA Negative     Blood, UA 3+    POCT pregnancy, urine [[id number]]  (Normal) Resulted: 07/30/21 2111    Lab Status: Final result Updated: 07/30/21 2111     EXT PREG TEST UR (Ref: Negative) NEGATIVE     Control VALID    CBC and differential [[id number]]  (Normal) Collected: 07/30/21 2056    Lab Status: Final result Specimen: Blood from Arm, Left Updated: 07/30/21 2101     WBC 9 64 Thousand/uL      RBC 4 22 Million/uL      Hemoglobin 12 3 g/dL      Hematocrit 37 1 %      MCV 88 fL      MCH 29 1 pg      MCHC 33 2 g/dL      RDW 11 7 %      MPV 9 4 fL      Platelets 571 Thousands/uL      Neutrophils Relative 63 %      Immat GRANS % 0 %      Lymphocytes Relative 27 %      Monocytes Relative 8 %      Eosinophils Relative 2 %      Basophils Relative 0 %      Neutrophils Absolute 6 02 Thousands/µL      Immature Grans Absolute 0 02 Thousand/uL      Lymphocytes Absolute 2 62 Thousands/µL      Monocytes Absolute 0 74 Thousand/µL      Eosinophils Absolute 0 22 Thousand/µL      Basophils Absolute 0 02 Thousands/µL                  No orders to display              Procedures  Procedures         ED Course  ED Course as of Jul 31 0328 Fri Jul 30, 2021   2152 Patient currently menstruating which explains red color to the urine and presence of blood     UA w Reflex to Microscopic w Reflex to Culture(!)   2204 PREGNANCY TEST URINE: NEGATIVE                             SBIRT 20yo+      Most Recent Value SBIRT (25 yo +)   In order to provide better care to our patients, we are screening all of our patients for alcohol and drug use  Would it be okay to ask you these screening questions? Yes Filed at: 07/30/2021 2035   Initial Alcohol Screen: US AUDIT-C    1  How often do you have a drink containing alcohol?  0 Filed at: 07/30/2021 2035   2  How many drinks containing alcohol do you have on a typical day you are drinking? 0 Filed at: 07/30/2021 2035   3a  Male UNDER 65: How often do you have five or more drinks on one occasion? 0 Filed at: 07/30/2021 2035   3b  FEMALE Any Age, or MALE 65+: How often do you have 4 or more drinks on one occassion? 0 Filed at: 07/30/2021 2035   Audit-C Score  0 Filed at: 07/30/2021 2035   KATHERINE: How many times in the past year have you    Used an illegal drug or used a prescription medication for non-medical reasons?   Never Filed at: 07/30/2021 2035                    MDM  Number of Diagnoses or Management Options  Rectal bleeding  Diagnosis management comments: Patient is a 22-year-old male who presents ED today with complaint of rectal bleeding x2 months  Examination showed minimal tenderness to palpation of the lower abdomen, digital rectal exam showed no acute bleeding, signs of external hemorrhoid or anal fissure  Hemoglobin/hematocrit were stable within normal limits  UA was positive for blood which was suspected to to currently on her menstrual cycle  - not indicative of UTI  Abdominal CT not indicated at this time due to lack of significant findings on abdominal exam  Extremity low suspicion for severe-intestinal bleeding due to stable H&H  Considered IBS/IBD  Suspected internal hemorrhoids versus anal fissure versus external hemorrhoid  Patient advised to follow-up with gastroenterology as soon as able for further evaluation of rectal bleeding  Advised follow-up with primary care as needed  Return to the ED with worsening symptoms as discussed with the patient  Patient stable on discharge       Amount and/or Complexity of Data Reviewed  Clinical lab tests: reviewed and ordered    Patient Progress  Patient progress: stable      Disposition  Final diagnoses:   Rectal bleeding     Time reflects when diagnosis was documented in both MDM as applicable and the Disposition within this note     Time User Action Codes Description Comment    7/30/2021 10:14 PM Akash Ceja Add [K62 5] Rectal bleeding       ED Disposition     ED Disposition Condition Date/Time Comment    Discharge Stable Fri Jul 30, 2021 10:14 PM April De Los Santos discharge to home/self care  Follow-up Information     Follow up With Specialties Details Why Contact Info Brad Vazquez Gastroenterology Schedule an appointment as soon as possible for a visit in 2 days for further evaluation [address] [facility] [address] [facility],  Family Medicine Schedule an appointment as soon as possible for a visit in 2 days for further evaluation [address] [facility] [id number]       R Delilah Shook [address] Emergency Department Emergency Medicine Go to  As needed, If symptoms worsen [address] [facility],[address] [id number]  [address] [place] [address] Emergency Department, [address], [address] [place] [address]          Discharge Medication List as of 7/30/2021 10:46 PM      CONTINUE these medications which have NOT CHANGED    Details   ergocalciferol (VITAMIN D2) 50,000 units TAKE 1 CAPSULE BY MOUTH ONE TIME PER WEEK, Historical Med           No discharge procedures on file      PDMP Review       Value Time User    PDMP Reviewed  Yes 11/16/2020 10:02 PM Rickey Delgado MD          ED Provider  Electronically Signed by           Debra Gómez PA-C  07/31/21 2790

## 2021-07-31 NOTE — DISCHARGE INSTRUCTIONS
Emphasize controlling constipation maintaining adequate hydration, increasing dietary fiber, using stool softener(colase) daily, and miralax as needed for severe constipation  Follow-up with Gastroenterology within 2-3 days for further evaluation of rectal bleeding  Follow-up with primary care within 2-3 days as needed  Return to the ED with worsening symptoms including development of fever/chills, severe fatigue, lightheadedness/dizziness, passing out, severe uncontrolled rectal bleeding, severe abdominal pain, altered mental status, pale discoloration of the skin